# Patient Record
Sex: MALE | Race: BLACK OR AFRICAN AMERICAN | NOT HISPANIC OR LATINO | URBAN - METROPOLITAN AREA
[De-identification: names, ages, dates, MRNs, and addresses within clinical notes are randomized per-mention and may not be internally consistent; named-entity substitution may affect disease eponyms.]

---

## 2018-06-05 ENCOUNTER — INPATIENT (INPATIENT)
Facility: HOSPITAL | Age: 83
LOS: 6 days | Discharge: NURSING, REHAB OR LONG-TERM CARE | DRG: 481 | End: 2018-06-12
Attending: SPECIALIST | Admitting: INTERNAL MEDICINE
Payer: COMMERCIAL

## 2018-06-05 ENCOUNTER — TRANSCRIPTION ENCOUNTER (OUTPATIENT)
Age: 83
End: 2018-06-05

## 2018-06-05 VITALS
HEART RATE: 83 BPM | SYSTOLIC BLOOD PRESSURE: 175 MMHG | OXYGEN SATURATION: 94 % | RESPIRATION RATE: 18 BRPM | DIASTOLIC BLOOD PRESSURE: 82 MMHG

## 2018-06-05 DIAGNOSIS — S72.001A FRACTURE OF UNSPECIFIED PART OF NECK OF RIGHT FEMUR, INITIAL ENCOUNTER FOR CLOSED FRACTURE: ICD-10-CM

## 2018-06-05 LAB
ALBUMIN SERPL ELPH-MCNC: 3.9 G/DL — SIGNIFICANT CHANGE UP (ref 3.3–5)
ALP SERPL-CCNC: 67 U/L — SIGNIFICANT CHANGE UP (ref 40–120)
ALT FLD-CCNC: 17 U/L — SIGNIFICANT CHANGE UP (ref 10–45)
ANION GAP SERPL CALC-SCNC: 13 MMOL/L — SIGNIFICANT CHANGE UP (ref 5–17)
APTT BLD: 35.7 SEC — SIGNIFICANT CHANGE UP (ref 27.5–37.4)
AST SERPL-CCNC: 20 U/L — SIGNIFICANT CHANGE UP (ref 10–40)
BASOPHILS # BLD AUTO: 0 K/UL — SIGNIFICANT CHANGE UP (ref 0–0.2)
BASOPHILS NFR BLD AUTO: 0.6 % — SIGNIFICANT CHANGE UP (ref 0–2)
BILIRUB SERPL-MCNC: 0.2 MG/DL — SIGNIFICANT CHANGE UP (ref 0.2–1.2)
BLD GP AB SCN SERPL QL: NEGATIVE — SIGNIFICANT CHANGE UP
BUN SERPL-MCNC: 21 MG/DL — SIGNIFICANT CHANGE UP (ref 7–23)
CALCIUM SERPL-MCNC: 9.3 MG/DL — SIGNIFICANT CHANGE UP (ref 8.4–10.5)
CHLORIDE SERPL-SCNC: 102 MMOL/L — SIGNIFICANT CHANGE UP (ref 96–108)
CO2 SERPL-SCNC: 28 MMOL/L — SIGNIFICANT CHANGE UP (ref 22–31)
CREAT SERPL-MCNC: 1.39 MG/DL — HIGH (ref 0.5–1.3)
EOSINOPHIL # BLD AUTO: 0 K/UL — SIGNIFICANT CHANGE UP (ref 0–0.5)
EOSINOPHIL NFR BLD AUTO: 0.6 % — SIGNIFICANT CHANGE UP (ref 0–6)
GLUCOSE SERPL-MCNC: 154 MG/DL — HIGH (ref 70–99)
HCT VFR BLD CALC: 37.8 % — LOW (ref 39–50)
HGB BLD-MCNC: 12.7 G/DL — LOW (ref 13–17)
INR BLD: 1.05 RATIO — SIGNIFICANT CHANGE UP (ref 0.88–1.16)
LYMPHOCYTES # BLD AUTO: 2.2 K/UL — SIGNIFICANT CHANGE UP (ref 1–3.3)
LYMPHOCYTES # BLD AUTO: 40 % — SIGNIFICANT CHANGE UP (ref 13–44)
MCHC RBC-ENTMCNC: 30.3 PG — SIGNIFICANT CHANGE UP (ref 27–34)
MCHC RBC-ENTMCNC: 33.7 GM/DL — SIGNIFICANT CHANGE UP (ref 32–36)
MCV RBC AUTO: 89.8 FL — SIGNIFICANT CHANGE UP (ref 80–100)
MONOCYTES # BLD AUTO: 0.6 K/UL — SIGNIFICANT CHANGE UP (ref 0–0.9)
MONOCYTES NFR BLD AUTO: 11 % — SIGNIFICANT CHANGE UP (ref 2–14)
NEUTROPHILS # BLD AUTO: 2.7 K/UL — SIGNIFICANT CHANGE UP (ref 1.8–7.4)
NEUTROPHILS NFR BLD AUTO: 47.8 % — SIGNIFICANT CHANGE UP (ref 43–77)
PLATELET # BLD AUTO: 195 K/UL — SIGNIFICANT CHANGE UP (ref 150–400)
POTASSIUM SERPL-MCNC: 4 MMOL/L — SIGNIFICANT CHANGE UP (ref 3.5–5.3)
POTASSIUM SERPL-SCNC: 4 MMOL/L — SIGNIFICANT CHANGE UP (ref 3.5–5.3)
PROT SERPL-MCNC: 7.4 G/DL — SIGNIFICANT CHANGE UP (ref 6–8.3)
PROTHROM AB SERPL-ACNC: 11.4 SEC — SIGNIFICANT CHANGE UP (ref 9.8–12.7)
RBC # BLD: 4.2 M/UL — SIGNIFICANT CHANGE UP (ref 4.2–5.8)
RBC # FLD: 12.8 % — SIGNIFICANT CHANGE UP (ref 10.3–14.5)
RH IG SCN BLD-IMP: POSITIVE — SIGNIFICANT CHANGE UP
RH IG SCN BLD-IMP: POSITIVE — SIGNIFICANT CHANGE UP
SODIUM SERPL-SCNC: 143 MMOL/L — SIGNIFICANT CHANGE UP (ref 135–145)
WBC # BLD: 5.6 K/UL — SIGNIFICANT CHANGE UP (ref 3.8–10.5)
WBC # FLD AUTO: 5.6 K/UL — SIGNIFICANT CHANGE UP (ref 3.8–10.5)

## 2018-06-05 PROCEDURE — 73552 X-RAY EXAM OF FEMUR 2/>: CPT | Mod: 26,RT

## 2018-06-05 PROCEDURE — 73502 X-RAY EXAM HIP UNI 2-3 VIEWS: CPT | Mod: 26,RT

## 2018-06-05 PROCEDURE — 73560 X-RAY EXAM OF KNEE 1 OR 2: CPT | Mod: 26,RT

## 2018-06-05 PROCEDURE — 93010 ELECTROCARDIOGRAM REPORT: CPT

## 2018-06-05 PROCEDURE — 99284 EMERGENCY DEPT VISIT MOD MDM: CPT | Mod: 25

## 2018-06-05 PROCEDURE — 70450 CT HEAD/BRAIN W/O DYE: CPT | Mod: 26

## 2018-06-05 PROCEDURE — 71045 X-RAY EXAM CHEST 1 VIEW: CPT | Mod: 26

## 2018-06-05 PROCEDURE — 72125 CT NECK SPINE W/O DYE: CPT | Mod: 26

## 2018-06-05 RX ORDER — MORPHINE SULFATE 50 MG/1
4 CAPSULE, EXTENDED RELEASE ORAL ONCE
Qty: 0 | Refills: 0 | Status: DISCONTINUED | OUTPATIENT
Start: 2018-06-05 | End: 2018-06-05

## 2018-06-05 RX ORDER — ACETAMINOPHEN 500 MG
1000 TABLET ORAL ONCE
Qty: 0 | Refills: 0 | Status: COMPLETED | OUTPATIENT
Start: 2018-06-05 | End: 2018-06-05

## 2018-06-05 RX ADMIN — Medication 400 MILLIGRAM(S): at 21:06

## 2018-06-05 RX ADMIN — MORPHINE SULFATE 4 MILLIGRAM(S): 50 CAPSULE, EXTENDED RELEASE ORAL at 21:06

## 2018-06-05 NOTE — CONSULT NOTE ADULT - SUBJECTIVE AND OBJECTIVE BOX
The patient was seen and examined tonight after an accidental fall with a right femoral neck fracture that requires orthopedic ORIF. 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 The patient is medically stable, medically optimized and has no medical contraindication to surgery tomorrow as required. Exam time 70 minutes including > than 50 % for bedside discussion and counseling, with his childern. Comprehensive consultation dictated #[phone]

## 2018-06-05 NOTE — ED PROVIDER NOTE - OBJECTIVE STATEMENT
84 yo male with PMHx of DM, dementia p/w right hip pain s/p fall. The patient reports that he was walking the recycling out of the kitchen when he tripped and landed on his right hip/buttock.  Fall was unwitnessed, but patient denies head trauma or LOC.  Patient only c/o right hip pain.  Unable to ambulate after fall.  Denies headache, weakness, numbness, fevers/chills, CP, SOB, abd pain, NVDC.

## 2018-06-05 NOTE — ED PROVIDER NOTE - ATTENDING CONTRIBUTION TO CARE
Private Physician James Gaines PCP, [facility]  83y male DM, Dementia, Pt comes to ed complains of pain rt hip sp trip an fall in kitchen at home, Not able to weight bear, No other injury or complaint . No loc,cp,palps,sob,cough,fc,abd pain. PE WDWN male awake alert normocephalic atraumatic neck supple neg by nexus chest clear anterior & posterior abd soft neuro gcs 15 speech fluent oriented x2 pain light touch intact  Anup Hernandez MD, Facep

## 2018-06-05 NOTE — CONSULT NOTE ADULT - ATTENDING COMMENTS
The patient is medically stable, medically optimized and has no medical contraindication to surgery tomorrow as required. Exam time 70 minutes including > than 50 % for bedside discussion and counseling. He is to continue Risperadol as needed TID frlkkkkkkkkkkkkkkkkkkkkkkkkkkkkkkkkkkkkkkkkkkkkkkkkkkkkkkkkkkkkkkkkkkkkkkkkkkkkkkkkkkkkkkkkkkkkkkkkkkkkkkkkkkkkkkkkkkkkkkkkkkkkkkkkkkkkkkkkkkkkkkkkkkkkkkkkkkkkkkkkkkkkkkkkkkkkkkkkkkkkkkkkkkkkkkkkkkkkkkkkkkkkkkkkkkkkkkkkkkkkkkkkkkkkkkkkkkkkkkkkkkkkkkkkkkkkkkkkkkkkkkkkkkkkkkkkkkkkkkkkkkkkkkkkkkkkkkkkkkkkkkkkkkkkkkkkkkkkkkkkkkkkkkkkkkkkkkkkkkkkkkkkkkkkkkkkkkkkkkkkkkkkkkkkkkkkkkkkkkkkkkkkkkkkkkkkkkkkkkkkkkkkkkkkkkkkkkkkkkkkkkkkkkkkkkkkkkkkkkkkkkkkkkkkkkkkkkkkkkkkkkkkkkkkkkkkkkkkkkkkkkkkkkkkkkkkkkkkkkkkkkkkkkkkkkkkkkkkkkkkkkkkkkkkkkkkkkkkkkkkkkkkkkkkkkkkkkkkkkkkkkkkkkkkkkkkkkkkkkkkkkkkkkkkkkkkkkkkkkkkkkkkkkkkkkkkkkkkkkkkkkkkkkkkkkkkkkkkkkkkkkkkkkkkkkkkkkkkkkkkkkkkkkkkkkkkkkkkkkkkkkkkkkkkkkkkkkkkkkkkkkkkkkkkkkkkkkkkkkkkkkkkkkkkkkkkkkkkkkkkkkkkkkkkkkkkkkkkkkkkkkkkkkkkkkkkkkkkkkkkkkkkkkkkkkkkkkkkkkkkkkkkkkkkkkkkkkkkkkkkkkkkkkkkkkkkkkkkkkkkkkkkkkkkkkkkkkkkkkkkkkkkkkkkkkkkkkkkkkkkkkkkkkkkkkkkkkkkkkkkkkkkkkkkkkkkkkkkkkkkkkkkkkkkkkkkkkkkkkkkkkkkkkkkkkkkkkkkkkkkkkkkkkkkkkkkkkkkkkkkkkkkkkkkkkkkkkkkkkkkkkkkkkkkkkkkkkkkkkkkkkkkkkkkkkkkkkkkkkkkkkkkkkkkkkkkkkkkkkkkkkkkkkkkkkkkkkkkkkkkkkkkkkkkkkkkkkkkkkkkkkkkkkkkkkkkkkkkkkkkkkkkkkkkkkkkkkkkkkkkkkkkkkkkkkkkkkkkkkkkkkkkkkkkkkkkkkkkkkkkkkkkkkkkkkkkkkkkkkkkkkkkkkkkkkkkkkkkkkkkkkkkkkkkkkkkkkkkkkkkkkkkkkkkkkkkkkkkkkkkkkkkkkkkkkkkkkkkkkkkkkkkkkkkkkkkkkkkkkkkkkkkkkkkkkkkkkkkkkkkkkkkkkkkkkkkkkkkkkkkkkkkkkkkkkkkkkkkkkkkkkkkkkkkkkkkkkkkkkkkkkkkkkkkkkkkkkkkkkkkkkkkkkkkkkkkkkkkkkkkkkkkkkkkkkkkkkkkkkkkkkkkkkkkkkkkkkkkkkkkkkkkkkkkkkkkkkkkkkkkkkkkkkkkkkkkkkkkkkkkkkkkkkkkkkkkkkkkkkkkkkkkkkkkkkkkkkkkkkkkkkkkkkkkkkkkkkkkkkkkkkkkkkkkkkkkkkkkkkkkkkkkkkkkkkkkkkkkkkkkkkkkkkkkkkkkkkkkkkkkkkkkkkkkkkkkkkkkkkkkkkkkkkkkkkkkkkkkkkkkkkkkkkkkkkkkkkkkkkkkkkkkkkkkkkkkkkkkkkkkkkkkkkkkkkkkkkkkkkkkkkkkkkkkkkkkkkkkkkkkkkkkkkkkkkkkkkkkkkkkkkkkkkkkkkkkkkkkkkkkkkkkkkkkkkkkkkkkkkkkkkkkkkkkkkkkkkkkkkkkkkkkkkkkkkkkkkkkkkkkkkkkkkkkkkkkkkkkkkkkkkkkkkkkkkkkkkkkkkkkkkkkkkkkkkkkkkkkkkkkkkkkkkkkkkkkkkkkkkkkkkkkkkkkkkkkkkkkkkkkkkkkkkkkkkkkkkkkkkkkkkkkkkkkkkkkkkkkkkkkkkkkkkkkkkkkkkkkkkkkkkkkkkkkkkkkkkkkkkkkkkkkkkkkkkkkkkkkkkkkkkkkkkkkkkkkkkkkkkkkkkkkkkkkkkkkkkkkkkkkkkkkkkkkkkkkkkkkkkkkkkkkkkkkkkkkkkkkkkkkkkkkkkkkkkkkkkkkkkkkkkkkkkkkkkkkkkkkkkkkkkkkkkkkkkkkkkkkkkkkkkkkkkkkkkkkkkkkkkkkkkkkkkkkkkkkkkkkkkkkkkkkkkkkkkkkkkkkkkkkkkkkkkkkkkkkkkkkkkkkkkkkkkkkkkkkkkkkkkkkkkkkkkkkkkkkkkkkkkkkkkkkkkkkkkkkkkkkkkkkkkkkkkkkkkkkkkkkkkkkkkkkkkkkkkkkkkkkkkkkkkkkkkkkkkkkkkkkkkkkkkkkkkkkkkkkkkkkkkkkkkkkkkkkkkkkkkkkkkkkkkkkkkkkkkkkkkkkkkkkkkkkkkkkkkkkkkkkkkkkkkkkkkkkkkkkkkkkkkkkkkkkkkkkkkkkkkkkkkkkkkkkkkkkkkkkkkkkkkkkkkkkkkkkkkkkkkkkkkkkkkkkkkkkkkkkkkkkkkkkkkkkkkkkkkkkkkkkkkkkkkkkkkkkkkkkkkkkkkkkkkkkkkkkkkkkkkkkkkkkkkkkkkkkkkkkkkkkkkkkkkkkkkkkkkkkkk                    The patient is medically stable, medically optimized and has no medical contraindication to surgery tomorrow as required. Exam time 70 minutes including > than 50 % for bedside discussion and counseling, with thepatient and fakpks                                                          The patient is medically stable, medically optimized and has no medical contraindication to surgery tomorrow as required. Exam time 70 minutes including > than 50 % for bedside discussion and counseling.

## 2018-06-05 NOTE — ED PROVIDER NOTE - PHYSICAL EXAMINATION
Gen: AAO x 2, NAD  Skin: No rashes or lesions  HEENT: NC/AT, PERRLA, EOMI, MMM  Resp: unlabored CTAB  Cardiac: rrr s1s2, no murmurs, rubs or gallops  GI: ND, +BS, Soft, NT  MSK: No midline cervical/thoracic/lumbar TTP, clavicles intact.  No snuff box TTP, compartments soft, +right hip pain.  Decreased ROM of right hip 2/2 pain.  2+ DP and PT BL  Neuro: no focal deficits

## 2018-06-05 NOTE — ED ADULT NURSE NOTE - OBJECTIVE STATEMENT
82 y/o male, a&o x3, brought in by EMS s/p mech trip and fall in kitchen while moving recyclables. Pt denies LOC, head/neck/back pain, weakness, dizziness, nausea, vomiting, fevers, chills, SOB, or chest pain. Breathing even, full, unlabored. Abdomen soft, nontender, nondistended. Tenderness and pain of right hip. No ecchymosis, no edema, no rotation/shortening noted due to pt position of comfort with leg shirlene while lying on right side. Safety maintained, stretcher locked in low position. Advised of plan of care. Family at bedside.

## 2018-06-05 NOTE — CONSULT NOTE ADULT - NSHPATTENDINGPLANDISCUSS_GEN_ALL_CORE
the patient, his son and daughter Merary4#fffffffffffffffffffffffffffffffffffffffffffffffffffffffffffffffffffffffffffffffffffffffffffffffffffffffffffffffffffffffffffffffffffffffffffffffffffffffffffffffffffffffffffffffffffffffffffffffffffffffffffffffffffffffffffffffffffffffffffffffffffffffffffffffffffffffffffffffffffffffffffffffffffffffffffffffffffffffffffffffffffffffffffffffffffffffffffffffffffffffffffffffffffffffffffffffffffffffffffffffffffffffffffffffffffffffffffffffffffffffffffffffffffffffffffffffffffffffffffffffffffffffffffffffffffffffffffffffffffffffffffffffffffffffffffffffffffffffffffffffffffffffffffffffffffffffffffffffffffffffffffffffffffffffffffffffffffffffffffffffffffffffffffffffffffffffffffffffffffffffffffffffffffffffffffffffffffffffffffffffffffffffffffffffffffffffffffffffffffffffffffffffffffffffffffffffffffffffffffffffffffffffffffffffffffffffffffffffffffffffffffffffffffffffffffffffffffffffffffffffffffffffffffffffffffffffffffffffffffffffffffffffffffffffffffffffffffffffffffffffffffffffffffffffffffffffffffffffffffffffffffffffffffffffffffffffffffffffffffffffffffffffffffffffffffffffffffffffffffffffffffffffffffffffffffffffffffffffffffffffffffffffffffffffffffffffffffffffffffffffffffffffffffffffffffffffffffffffffffffffffffffffffffffffffffffffffffffffffffffffffffffffffffffffffffffffffffffffffffffffffffffffffffffffffffffffffffffffffffffffffffffffffffffffffffffffffffffffffffffffffffffffffffffffffffffffffffffffffffffffffffffffffffffffffffffffffffffffffffffffffffffffffffffffffffffff

## 2018-06-05 NOTE — ED PROVIDER NOTE - PROGRESS NOTE DETAILS
James Gaines paged  Anup Hernandez MD, Facep Discussed with Dr Eder miller ortho and [facility]  [facility]  Anup Hernandez MD, Facep

## 2018-06-05 NOTE — CONSULT NOTE ADULT - SUBJECTIVE AND OBJECTIVE BOX
CONSULTING SERVICE:  Internal Medicine.    HISTORY OF PRESENT ILLNESS:  This is the first recent [facility] admission for this 83-year-old male who was at home in his kitchen when he accidentally tripped when turning and fell to the ground landing on his right hip.  He was unable to rise because of pain and he was brought by ambulance to the emergency room for evaluation.  X-rays confirmed an acute right femoral neck fracture, which requires ORIF as scheduled for tomorrow morning.  The patient has a history of insulin-dependent diabetes and has diagnosis of senile dementia which has been slowly progressive.  He is cared for by his wife and son and has direct observation at all times because of his forgetfulness and confusion    MEDICATIONS:  The patient's medications at present time include metformin 500 mg twice a day, risperidone 0.25 mg three times a day, mirtazapine 30 mg at bedtime, Levemir insulin 34 units in the morning, NovoLog insulin 6 units also in the morning.    ALLERGIES:  HE HAS NO KNOWN ALLERGIES.    PAST SURGICAL HISTORY:  Includes hernia repair in 2011.    PAST MEDICAL HISTORY:  Normal, except for dementia and insulin-dependent diabetes.    FAMILY HISTORY:  Significant for multiple members of family have dementia.  His grandmother had diabetes.    DIET:  The patient's diet is low sugar.  His weight is 167 pounds.          SOCIAL HISTORY:  He is a nonsmoker, nondrinker with a distant history of smoking over 30 years ago.  He has occasional social alcohol.  He has no drug history.  Socially, he is retired [employer].  He lives with his wife and son.    REVIEW OF SYSTEMS:  Essentially normal.  He has no headaches or dizziness.  Decreased hearing, decreased vision associated with his age.  No sinusitis.  Poor dentition with dentures bilaterally.  He has no difficulty swallowing.  He has no shortness of breath, cough, congestion or wheezing at rest or with exertion.  He has no chest pain, angina, palpitations or blackouts.  He has no syncopal events.  He has no abdominal pain, change in bowel habits or GI bleeding.  He has no reflux esophagitis.  He has no dysuria, frequency, hesitancy or incontinence.  He has no nocturia.  He has occasional rashes on his abdomen at the sites where he injects Levemir insulin.  His neurological examination shows no focal deficits.  He does have decreased recent memory.  He has no joint pains or limitations in function.  He has no arthritis of the knees, hips or hands.  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    PHYSICAL EXAMINATION:  At this time shows, VITAL SIGNS:  Blood pressure of 120/70, pulse of 68, respirations 16.  He is afebrile.  HEENT:  Head and neck examination is normal.  He has no oral masses.  CHEST:  Clear with good breath sounds bilaterally.  CARDIAC:  Examination without gallops or murmurs.  ABDOMEN:  Soft.  No masses, tenderness, guarding or rebound.  EXTREMITIES:  Without clubbing, cyanosis or edema.  NEUROLOGIC:  He has no focal neurological deficits.    LABORATORY DATA:  Laboratories that were obtained shows a hemoglobin of 12.7, hematocrit 37.8, white count 5.6, platelet count is 195,000.  INR is 1.05.  PTT is 35.7, sodium 143, potassium 4.0, chloride 102, CO2 is 28, BUN is 21, creatinine is 1.39, blood sugars 154.  Liver function tests are within normal limits.    RADIOLOGY DATA:  CT of the head was performed which showed calcification and bilateral basal ganglia and mild to moderate chronic microvascular changes with some dilatation of the sternum.  CT of the neck was performed showing DJD.  His chest x-ray was performed that shows patchy infiltrates and opacities in the upper lobe greater the left than right.  EKG is normal with normal sinus rhythm, heart rate of 67, normal ST-T waves.    IMPRESSION AND PLAN:  The impression at this time is the patient had an accidental fall with right femoral neck impacted fracture, which requires open reduction and internal fixation in the morning.  He has advanced senile dementia with agitation, requiring Risperdal for control.  Blood sugar monitoring will take place and with fingersticks when NPO.  EKG was obtained, normal sinus rhythm with normal ST-T waves with normal EKG.  The impression is the patient had an accidental fall with an acute right femoral neck fracture which requires surgical stabilization in the morning.  He has senile dementia and insulin-dependent diabetes which are chronic conditions and there are no acute changes as a result.  The patient is medically stable and has no medical contraindications to surgery tomorrow as is required.  Examination time was 70 minutes of which greater than 50% was necessary for bedside discussion and counseling.        _______________________    DICT:	GUILLERMO GEORGE MD  ([id number]) 06/06/2018 02:29 AM  TRANS:	[id number] 06/06/2018 02:33 AM  JOB:	[id number]    Electronically Signed by:  GUILLERMO GEORGE 06/06/2018 08:45:50 PM

## 2018-06-06 ENCOUNTER — TRANSCRIPTION ENCOUNTER (OUTPATIENT)
Age: 83
End: 2018-06-06

## 2018-06-06 DIAGNOSIS — F03.90 UNSPECIFIED DEMENTIA WITHOUT BEHAVIORAL DISTURBANCE: ICD-10-CM

## 2018-06-06 DIAGNOSIS — E11.9 TYPE 2 DIABETES MELLITUS WITHOUT COMPLICATIONS: ICD-10-CM

## 2018-06-06 DIAGNOSIS — S72.001A FRACTURE OF UNSPECIFIED PART OF NECK OF RIGHT FEMUR, INITIAL ENCOUNTER FOR CLOSED FRACTURE: ICD-10-CM

## 2018-06-06 LAB
ANION GAP SERPL CALC-SCNC: 12 MMOL/L — SIGNIFICANT CHANGE UP (ref 5–17)
APTT BLD: 34.9 SEC — SIGNIFICANT CHANGE UP (ref 27.5–37.4)
BLD GP AB SCN SERPL QL: NEGATIVE — SIGNIFICANT CHANGE UP
BUN SERPL-MCNC: 19 MG/DL — SIGNIFICANT CHANGE UP (ref 7–23)
CALCIUM SERPL-MCNC: 9.1 MG/DL — SIGNIFICANT CHANGE UP (ref 8.4–10.5)
CHLORIDE SERPL-SCNC: 101 MMOL/L — SIGNIFICANT CHANGE UP (ref 96–108)
CO2 SERPL-SCNC: 25 MMOL/L — SIGNIFICANT CHANGE UP (ref 22–31)
CREAT SERPL-MCNC: 1.17 MG/DL — SIGNIFICANT CHANGE UP (ref 0.5–1.3)
GLUCOSE BLDC GLUCOMTR-MCNC: 177 MG/DL — HIGH (ref 70–99)
GLUCOSE BLDC GLUCOMTR-MCNC: 190 MG/DL — HIGH (ref 70–99)
GLUCOSE BLDC GLUCOMTR-MCNC: 200 MG/DL — HIGH (ref 70–99)
GLUCOSE BLDC GLUCOMTR-MCNC: 216 MG/DL — HIGH (ref 70–99)
GLUCOSE BLDC GLUCOMTR-MCNC: 231 MG/DL — HIGH (ref 70–99)
GLUCOSE BLDC GLUCOMTR-MCNC: 239 MG/DL — HIGH (ref 70–99)
GLUCOSE SERPL-MCNC: 255 MG/DL — HIGH (ref 70–99)
HBA1C BLD-MCNC: 7.4 % — HIGH (ref 4–5.6)
HCT VFR BLD CALC: 37.4 % — LOW (ref 39–50)
HCT VFR BLD CALC: 39.4 % — SIGNIFICANT CHANGE UP (ref 39–50)
HGB BLD-MCNC: 12.8 G/DL — LOW (ref 13–17)
HGB BLD-MCNC: 13 G/DL — SIGNIFICANT CHANGE UP (ref 13–17)
INR BLD: 1.2 RATIO — HIGH (ref 0.88–1.16)
MCHC RBC-ENTMCNC: 29.5 PG — SIGNIFICANT CHANGE UP (ref 27–34)
MCHC RBC-ENTMCNC: 30.6 PG — SIGNIFICANT CHANGE UP (ref 27–34)
MCHC RBC-ENTMCNC: 33.1 GM/DL — SIGNIFICANT CHANGE UP (ref 32–36)
MCHC RBC-ENTMCNC: 34.2 GM/DL — SIGNIFICANT CHANGE UP (ref 32–36)
MCV RBC AUTO: 89.2 FL — SIGNIFICANT CHANGE UP (ref 80–100)
MCV RBC AUTO: 89.6 FL — SIGNIFICANT CHANGE UP (ref 80–100)
PLATELET # BLD AUTO: 165 K/UL — SIGNIFICANT CHANGE UP (ref 150–400)
PLATELET # BLD AUTO: 186 K/UL — SIGNIFICANT CHANGE UP (ref 150–400)
POTASSIUM SERPL-MCNC: 4.7 MMOL/L — SIGNIFICANT CHANGE UP (ref 3.5–5.3)
POTASSIUM SERPL-SCNC: 4.7 MMOL/L — SIGNIFICANT CHANGE UP (ref 3.5–5.3)
PROTHROM AB SERPL-ACNC: 13.1 SEC — HIGH (ref 9.8–12.7)
RBC # BLD: 4.18 M/UL — LOW (ref 4.2–5.8)
RBC # BLD: 4.42 M/UL — SIGNIFICANT CHANGE UP (ref 4.2–5.8)
RBC # FLD: 12.8 % — SIGNIFICANT CHANGE UP (ref 10.3–14.5)
RBC # FLD: 12.8 % — SIGNIFICANT CHANGE UP (ref 10.3–14.5)
RH IG SCN BLD-IMP: POSITIVE — SIGNIFICANT CHANGE UP
SODIUM SERPL-SCNC: 138 MMOL/L — SIGNIFICANT CHANGE UP (ref 135–145)
WBC # BLD: 8.2 K/UL — SIGNIFICANT CHANGE UP (ref 3.8–10.5)
WBC # BLD: 8.7 K/UL — SIGNIFICANT CHANGE UP (ref 3.8–10.5)
WBC # FLD AUTO: 8.2 K/UL — SIGNIFICANT CHANGE UP (ref 3.8–10.5)
WBC # FLD AUTO: 8.7 K/UL — SIGNIFICANT CHANGE UP (ref 3.8–10.5)

## 2018-06-06 PROCEDURE — 72170 X-RAY EXAM OF PELVIS: CPT | Mod: 26

## 2018-06-06 PROCEDURE — 71045 X-RAY EXAM CHEST 1 VIEW: CPT | Mod: 26

## 2018-06-06 RX ORDER — DEXTROSE 50 % IN WATER 50 %
15 SYRINGE (ML) INTRAVENOUS ONCE
Qty: 0 | Refills: 0 | Status: DISCONTINUED | OUTPATIENT
Start: 2018-06-06 | End: 2018-06-06

## 2018-06-06 RX ORDER — OXYCODONE HYDROCHLORIDE 5 MG/1
5 TABLET ORAL EVERY 4 HOURS
Qty: 0 | Refills: 0 | Status: DISCONTINUED | OUTPATIENT
Start: 2018-06-06 | End: 2018-06-10

## 2018-06-06 RX ORDER — SODIUM CHLORIDE 9 MG/ML
1000 INJECTION INTRAMUSCULAR; INTRAVENOUS; SUBCUTANEOUS
Qty: 0 | Refills: 0 | Status: DISCONTINUED | OUTPATIENT
Start: 2018-06-06 | End: 2018-06-06

## 2018-06-06 RX ORDER — SODIUM CHLORIDE 9 MG/ML
1000 INJECTION, SOLUTION INTRAVENOUS
Qty: 0 | Refills: 0 | Status: DISCONTINUED | OUTPATIENT
Start: 2018-06-06 | End: 2018-06-08

## 2018-06-06 RX ORDER — ACETAMINOPHEN 500 MG
975 TABLET ORAL EVERY 8 HOURS
Qty: 0 | Refills: 0 | Status: DISCONTINUED | OUTPATIENT
Start: 2018-06-06 | End: 2018-06-06

## 2018-06-06 RX ORDER — ASPIRIN/CALCIUM CARB/MAGNESIUM 324 MG
81 TABLET ORAL DAILY
Qty: 0 | Refills: 0 | Status: DISCONTINUED | OUTPATIENT
Start: 2018-06-06 | End: 2018-06-12

## 2018-06-06 RX ORDER — OXYCODONE HYDROCHLORIDE 5 MG/1
5 TABLET ORAL EVERY 4 HOURS
Qty: 0 | Refills: 0 | Status: DISCONTINUED | OUTPATIENT
Start: 2018-06-06 | End: 2018-06-06

## 2018-06-06 RX ORDER — GLUCAGON INJECTION, SOLUTION 0.5 MG/.1ML
1 INJECTION, SOLUTION SUBCUTANEOUS ONCE
Qty: 0 | Refills: 0 | Status: DISCONTINUED | OUTPATIENT
Start: 2018-06-06 | End: 2018-06-06

## 2018-06-06 RX ORDER — INSULIN LISPRO 100/ML
VIAL (ML) SUBCUTANEOUS
Qty: 0 | Refills: 0 | Status: DISCONTINUED | OUTPATIENT
Start: 2018-06-06 | End: 2018-06-10

## 2018-06-06 RX ORDER — OXYCODONE HYDROCHLORIDE 5 MG/1
10 TABLET ORAL EVERY 4 HOURS
Qty: 0 | Refills: 0 | Status: DISCONTINUED | OUTPATIENT
Start: 2018-06-06 | End: 2018-06-10

## 2018-06-06 RX ORDER — DEXTROSE 50 % IN WATER 50 %
12.5 SYRINGE (ML) INTRAVENOUS ONCE
Qty: 0 | Refills: 0 | Status: DISCONTINUED | OUTPATIENT
Start: 2018-06-06 | End: 2018-06-06

## 2018-06-06 RX ORDER — INSULIN DETEMIR 100/ML (3)
0 INSULIN PEN (ML) SUBCUTANEOUS
Qty: 0 | Refills: 0 | COMMUNITY

## 2018-06-06 RX ORDER — HYDROMORPHONE HYDROCHLORIDE 2 MG/ML
0.25 INJECTION INTRAMUSCULAR; INTRAVENOUS; SUBCUTANEOUS
Qty: 0 | Refills: 0 | Status: DISCONTINUED | OUTPATIENT
Start: 2018-06-06 | End: 2018-06-06

## 2018-06-06 RX ORDER — RISPERIDONE 4 MG/1
1 TABLET ORAL
Qty: 0 | Refills: 0 | Status: DISCONTINUED | OUTPATIENT
Start: 2018-06-06 | End: 2018-06-08

## 2018-06-06 RX ORDER — INSULIN LISPRO 100/ML
VIAL (ML) SUBCUTANEOUS AT BEDTIME
Qty: 0 | Refills: 0 | Status: DISCONTINUED | OUTPATIENT
Start: 2018-06-06 | End: 2018-06-06

## 2018-06-06 RX ORDER — SODIUM CHLORIDE 9 MG/ML
1000 INJECTION, SOLUTION INTRAVENOUS
Qty: 0 | Refills: 0 | Status: DISCONTINUED | OUTPATIENT
Start: 2018-06-06 | End: 2018-06-06

## 2018-06-06 RX ORDER — DEXTROSE 50 % IN WATER 50 %
25 SYRINGE (ML) INTRAVENOUS ONCE
Qty: 0 | Refills: 0 | Status: DISCONTINUED | OUTPATIENT
Start: 2018-06-06 | End: 2018-06-06

## 2018-06-06 RX ORDER — DOCUSATE SODIUM 100 MG
100 CAPSULE ORAL THREE TIMES A DAY
Qty: 0 | Refills: 0 | Status: DISCONTINUED | OUTPATIENT
Start: 2018-06-06 | End: 2018-06-06

## 2018-06-06 RX ORDER — INSULIN LISPRO 100/ML
VIAL (ML) SUBCUTANEOUS AT BEDTIME
Qty: 0 | Refills: 0 | Status: DISCONTINUED | OUTPATIENT
Start: 2018-06-06 | End: 2018-06-10

## 2018-06-06 RX ORDER — OXYCODONE HYDROCHLORIDE 5 MG/1
2.5 TABLET ORAL EVERY 4 HOURS
Qty: 0 | Refills: 0 | Status: DISCONTINUED | OUTPATIENT
Start: 2018-06-06 | End: 2018-06-06

## 2018-06-06 RX ORDER — CEFAZOLIN SODIUM 1 G
2000 VIAL (EA) INJECTION EVERY 8 HOURS
Qty: 0 | Refills: 0 | Status: COMPLETED | OUTPATIENT
Start: 2018-06-06 | End: 2018-06-07

## 2018-06-06 RX ORDER — SENNA PLUS 8.6 MG/1
2 TABLET ORAL AT BEDTIME
Qty: 0 | Refills: 0 | Status: DISCONTINUED | OUTPATIENT
Start: 2018-06-06 | End: 2018-06-06

## 2018-06-06 RX ORDER — ONDANSETRON 8 MG/1
4 TABLET, FILM COATED ORAL EVERY 6 HOURS
Qty: 0 | Refills: 0 | Status: DISCONTINUED | OUTPATIENT
Start: 2018-06-06 | End: 2018-06-12

## 2018-06-06 RX ORDER — SENNA PLUS 8.6 MG/1
2 TABLET ORAL AT BEDTIME
Qty: 0 | Refills: 0 | Status: DISCONTINUED | OUTPATIENT
Start: 2018-06-06 | End: 2018-06-12

## 2018-06-06 RX ORDER — RISPERIDONE 4 MG/1
1 TABLET ORAL
Qty: 0 | Refills: 0 | Status: DISCONTINUED | OUTPATIENT
Start: 2018-06-06 | End: 2018-06-06

## 2018-06-06 RX ORDER — INSULIN LISPRO 100/ML
VIAL (ML) SUBCUTANEOUS
Qty: 0 | Refills: 0 | Status: DISCONTINUED | OUTPATIENT
Start: 2018-06-06 | End: 2018-06-06

## 2018-06-06 RX ORDER — ACETAMINOPHEN 500 MG
650 TABLET ORAL EVERY 6 HOURS
Qty: 0 | Refills: 0 | Status: DISCONTINUED | OUTPATIENT
Start: 2018-06-06 | End: 2018-06-12

## 2018-06-06 RX ORDER — ONDANSETRON 8 MG/1
4 TABLET, FILM COATED ORAL ONCE
Qty: 0 | Refills: 0 | Status: DISCONTINUED | OUTPATIENT
Start: 2018-06-06 | End: 2018-06-06

## 2018-06-06 RX ADMIN — OXYCODONE HYDROCHLORIDE 5 MILLIGRAM(S): 5 TABLET ORAL at 09:49

## 2018-06-06 RX ADMIN — Medication 2: at 08:37

## 2018-06-06 RX ADMIN — Medication 81 MILLIGRAM(S): at 23:15

## 2018-06-06 RX ADMIN — OXYCODONE HYDROCHLORIDE 10 MILLIGRAM(S): 5 TABLET ORAL at 23:16

## 2018-06-06 RX ADMIN — SODIUM CHLORIDE 100 MILLILITER(S): 9 INJECTION INTRAMUSCULAR; INTRAVENOUS; SUBCUTANEOUS at 00:46

## 2018-06-06 RX ADMIN — OXYCODONE HYDROCHLORIDE 10 MILLIGRAM(S): 5 TABLET ORAL at 23:45

## 2018-06-06 RX ADMIN — SENNA PLUS 2 TABLET(S): 8.6 TABLET ORAL at 23:16

## 2018-06-06 RX ADMIN — OXYCODONE HYDROCHLORIDE 5 MILLIGRAM(S): 5 TABLET ORAL at 09:19

## 2018-06-06 RX ADMIN — Medication 1000 MILLIGRAM(S): at 00:44

## 2018-06-06 RX ADMIN — SODIUM CHLORIDE 75 MILLILITER(S): 9 INJECTION, SOLUTION INTRAVENOUS at 19:16

## 2018-06-06 RX ADMIN — RISPERIDONE 1 MILLIGRAM(S): 4 TABLET ORAL at 23:16

## 2018-06-06 RX ADMIN — Medication 100 MILLIGRAM(S): at 23:15

## 2018-06-06 RX ADMIN — MORPHINE SULFATE 4 MILLIGRAM(S): 50 CAPSULE, EXTENDED RELEASE ORAL at 00:44

## 2018-06-06 RX ADMIN — Medication 1: at 19:14

## 2018-06-06 RX ADMIN — Medication 2: at 13:02

## 2018-06-06 NOTE — DISCHARGE NOTE ADULT - CARE PLAN
Principal Discharge DX:	Closed fracture of right hip, initial encounter  Goal:	return to baseline activities of daily living  Assessment and plan of treatment:	1.	Pain Control  2.	Walking with full weight bearing as tolerated, with assistive devices (walker/Cane as Needed)  3.	DVT Prophylaxis for 30 days w ASA 81mg daily  4.	PT as needed  5.	Follow up with Dr. Newman as Outpatient in 10-14 Days after Discharge from the Hospital or Rehab. Call Office For Appointment.  6.	Remove Staples Post-Op Day 14, and Remove Dressing Post-Op Day 10, with Daily Dressing Changes as Need.  7.	Ice affected area as Needed  8.	Keep Dressing  Clean and dry.

## 2018-06-06 NOTE — H&P ADULT - HISTORY OF PRESENT ILLNESS
83 year old male, PMH Dementia, presented to the [facility] ED following a fall at home. Patient was moving around the recycling, when he tripped and fell to the ground. His son found him on the ground and called EMS. No CP/SOB. No head strike or LOC. Patient is a community ambulator with no assistive devices. However, son and daughter report that he likely needs assistive devices.

## 2018-06-06 NOTE — H&P ADULT - NSHPPHYSICALEXAM_GEN_ALL_CORE
RLE Shortened/Externally rotated  Pain with heel strike/log roll  EHL/FHL/TA/GSC intact  SILT DP/SP/S/S/T  Skin intact

## 2018-06-06 NOTE — DISCHARGE NOTE ADULT - NS AS ACTIVITY OBS
Walking-Outdoors allowed/Showering allowed/Walking-Indoors allowed Walking-Outdoors allowed/Stairs allowed/Showering allowed/Walking-Indoors allowed/Do not make important decisions/Do not drive or operate machinery/No Heavy lifting/straining

## 2018-06-06 NOTE — DISCHARGE NOTE ADULT - HOSPITAL COURSE
The patient is a 83M year old male status Right hip CRPP for a right femoral neck fracture. Patient presented to [facility] and medically cleared for an elective surgical procedure. The patient was taken to the operating room on date mentioned above. Prophylactic antibiotics were started before the procedure and continued for 24 hours.  There were no complications during the procedure and patient tolerated the procedure well.  The patient was transferred to recovery room in stable condition and subsequently to surgical floor.  Patient was placed ASA for anticoagulation.  All home medications were continued.  The patient received physical therapy daily and daily labs were followed.  The dressing was kept clean, dry, intact.  The rest of the hospital stay was unremarkable. The patient is a 83M year old male status Right hip CRPP on 6/6.18 for a right femoral neck fracture. Patient presented to [facility] and medically cleared for an elective surgical procedure. The patient was taken to the operating room on date mentioned above. Prophylactic antibiotics were started before the procedure and continued for 24 hours.  There were no complications during the procedure and patient tolerated the procedure well.  The patient was transferred to recovery room in stable condition and subsequently to surgical floor.  Patient was placed ASA for anticoagulation.  All home medications were continued.  The patient received physical therapy daily and daily labs were followed.  The dressing was kept clean, dry, intact.  The rest of the hospital stay was unremarkable. Patient was evaluated by physical therapist fot weight bearing as tolerated ambulation and advised that patient would benefit from admission to rehab facility. Patient should follow up with Dr Newman 3-4 weeks post op. The patient is a 83M year old male status Right hip CRPP on 6/6.18 for a right femoral neck fracture. Patient presented to [facility] and medically cleared for an elective surgical procedure. The patient was taken to the operating room on date mentioned above. Prophylactic antibiotics were started before the procedure and continued for 24 hours.  There were no complications during the procedure and patient tolerated the procedure well.  The patient was transferred to recovery room in stable condition and subsequently to surgical floor.  Patient was placed ASA for anticoagulation.  All home medications were continued.  The patient received physical therapy daily and daily labs were followed.  The dressing was kept clean, dry, intact.  The rest of the hospital stay was unremarkable. Patient was seen by Endocrinologist in hospital and recommendations were made and implemented.  Patient was evaluated by physical therapist for weight bearing as tolerated ambulation and advised that patient would benefit from admission to rehab facility. On 6/12/18, fingersticks were reviewed with endocrinology team and advised pt be discharged to Rehab with Lantus 25 units at bedtime and Humalog 10 units TID AC and no Metformin. Patient should follow up with Dr Newman 3-4 weeks post op and endocrinologist -Dr. Molina- upon discharge from Rehab for diabetes checkup.

## 2018-06-06 NOTE — DISCHARGE NOTE ADULT - PATIENT PORTAL LINK FT
You can access the [email or web address] Patient Portal, offered by [facility], by registering with the following website: [email or web address]

## 2018-06-06 NOTE — DISCHARGE NOTE ADULT - MEDICATION SUMMARY - MEDICATIONS TO STOP TAKING
I will STOP taking the medications listed below when I get home from the hospital:  None I will STOP taking the medications listed below when I get home from the hospital:    metFORMIN    mirtazapine    Levemir  -- 34 unit(s) subcutaneous once a day    NovoLOG 100 units/mL subcutaneous solution  -- 6 unit(s) subcutaneous 3 times a day (before meals)

## 2018-06-06 NOTE — CHART NOTE - NSCHARTNOTEFT_GEN_A_CORE
Patient resting without complaints.  Denies chest pain, SOB, N/V.    T(C): 37.1 (06-06-18 @ 19:15)  T(F): 98.8 (06-06-18 @ 19:15)  HR: 81 (06-06-18 @ 20:00)  BP: 135/66 (06-06-18 @ 20:00)  RR: 16 (06-06-18 @ 20:00)  SpO2: 99% (06-06-18 @ 20:00)      Exam:  Alert and Oriented, No Acute Distress    Cardio: RRR S1,S2    Pulm: CTA B/L    R Lower Extremity:  Hip Dsg CDI  Calf Soft, Non-tender  +PF/DF  Sensation grossly intact  +DP Pulse                          12.8   8.2   )-----------( 186      ( 06 Jun 2018 09:50 )             37.4        138  |  101  |  19  ----------------------------<  255<H>  4.7   |  25  |  1.17      A/P: 83y Male s/p R hip CRPP; Stable    -Pain Control  -DVT PPx; IS  -FU post-op CBC  -Am labs  -PT Eval-WBAT RLE  -Spoke with Medicine Dr Sims regarding pre-op CXR, likely fibrotic changes from h/o smoking given pt is asymptomatic including afebrile & no leukocytosis;   FU PCXR for comparison  -Continue Current Tx.    Brittani Rodriguez PA-C  Orthopedic Surgery  Pagers [phone]

## 2018-06-06 NOTE — H&P ADULT - ASSESSMENT
A/P: 83 year old male with R femoral neck fracture  - Admit to Ortho  - OR Planning  - Preoperative Labs: CBC, BMP, PT/INR, Type and Screen, UA, CXR, EKG  - Consent in chart  - Medicine Clearance  - NPO  - Hold anticoagulation for OR

## 2018-06-06 NOTE — BRIEF OPERATIVE NOTE - PROCEDURE
<<-----Click on this checkbox to enter Procedure Closed reduction and percutaneous pinning (CRPP)  06/06/2018    Active  SWEINER

## 2018-06-06 NOTE — DISCHARGE NOTE ADULT - ADDITIONAL INSTRUCTIONS
Follow up with your endocrinologist- . call Dr. Molina's office to schedule follow up appointment to discuss recent changes to your diabetes medications/general diabetes checkup.

## 2018-06-06 NOTE — DISCHARGE NOTE ADULT - MEDICATION SUMMARY - MEDICATIONS TO TAKE
I will START or STAY ON the medications listed below when I get home from the hospital:    oxyCODONE 10 mg oral tablet  -- 1 tab(s) by mouth every 4 hours, As needed, Moderate Pain  -- Indication: For prn pain    oxyCODONE 5 mg oral tablet  -- 1 tab(s) by mouth every 4 hours, As needed, Mild Pain  -- Indication: For prn pain    aspirin 81 mg oral delayed release tablet  -- 1 tab(s) by mouth once a day  -- Indication: For Dvt ppx    mirtazapine  -- Indication: For per pmd    insulin  -- Indication: For per pmd    Levemir  -- 34 unit(s) subcutaneous once a day  -- Indication: For per pmd    NovoLOG 100 units/mL subcutaneous solution  -- 6 unit(s) subcutaneous 3 times a day (before meals)  -- Indication: For per pmd    metFORMIN  -- Indication: For per pmd    risperiDONE  -- Indication: For per pmd I will START or STAY ON the medications listed below when I get home from the hospital:    aspirin 81 mg oral delayed release tablet  -- 1 tab(s) by mouth once a day  -- Indication: For Dvt ppx    oxyCODONE 10 mg oral tablet  -- 1 tab(s) by mouth every 6 hours, As Needed for Severe Pain  -- Indication: For Pain    oxyCODONE 5 mg oral tablet  -- 1 tab(s) by mouth every 6 hours, As Needed for Moderate Pain  -- Indication: For pain    acetaminophen 325 mg oral tablet  -- 3 tab(s) by mouth every 8 hours, As Needed - 3)  -- Indication: For Pain     Lantus 100 units/mL subcutaneous solution  -- 25 unit(s) subcutaneous once a day (at bedtime)  -- Indication: For Diabetes    HumaLOG 100 units/mL injectable solution  -- 10 unit(s) subcutaneously 3 times a day (before meals)  -- Indication: For Diabetes    atorvastatin 10 mg oral tablet  -- 1 tab(s) by mouth once a day (at bedtime)  -- Indication: For Hyperlipidemia    risperiDONE 0.25 mg oral tablet  -- 1 tab(s) by mouth 3 times a day  -- Indication: For Antipsychotic    senna oral tablet  -- 2 tab(s) by mouth once a day (at bedtime)  -- Indication: For laxative    Colace 100 mg oral capsule  -- 1 cap(s) by mouth 3 times a day  -- Indication: For laxative    Protonix 40 mg oral delayed release tablet  -- 1 tab(s) by mouth once a day before breakfast  -- Indication: For ulcer prophylaxis

## 2018-06-06 NOTE — DISCHARGE NOTE ADULT - PLAN OF CARE
return to baseline activities of daily living 1.	Pain Control  2.	Walking with full weight bearing as tolerated, with assistive devices (walker/Cane as Needed)  3.	DVT Prophylaxis for 30 days w ASA 81mg daily  4.	PT as needed  5.	Follow up with Dr. Newman as Outpatient in 10-14 Days after Discharge from the Hospital or Rehab. Call Office For Appointment.  6.	Remove Staples Post-Op Day 14, and Remove Dressing Post-Op Day 10, with Daily Dressing Changes as Need.  7.	Ice affected area as Needed  8.	Keep Dressing  Clean and dry.

## 2018-06-06 NOTE — DISCHARGE NOTE ADULT - CARE PROVIDER_API CALL
Gerald Newman), Orthopaedic Surgery  [address] [place] [address]  Phone: [phone]  Fax: [phone]

## 2018-06-07 LAB
ANION GAP SERPL CALC-SCNC: 17 MMOL/L — SIGNIFICANT CHANGE UP (ref 5–17)
BASOPHILS # BLD AUTO: 0 K/UL — SIGNIFICANT CHANGE UP (ref 0–0.2)
BASOPHILS NFR BLD AUTO: 0.4 % — SIGNIFICANT CHANGE UP (ref 0–2)
BUN SERPL-MCNC: 16 MG/DL — SIGNIFICANT CHANGE UP (ref 7–23)
CALCIUM SERPL-MCNC: 8.5 MG/DL — SIGNIFICANT CHANGE UP (ref 8.4–10.5)
CHLORIDE SERPL-SCNC: 98 MMOL/L — SIGNIFICANT CHANGE UP (ref 96–108)
CO2 SERPL-SCNC: 23 MMOL/L — SIGNIFICANT CHANGE UP (ref 22–31)
CREAT SERPL-MCNC: 1.06 MG/DL — SIGNIFICANT CHANGE UP (ref 0.5–1.3)
EOSINOPHIL # BLD AUTO: 0 K/UL — SIGNIFICANT CHANGE UP (ref 0–0.5)
EOSINOPHIL NFR BLD AUTO: 0.2 % — SIGNIFICANT CHANGE UP (ref 0–6)
GLUCOSE BLDC GLUCOMTR-MCNC: 218 MG/DL — HIGH (ref 70–99)
GLUCOSE BLDC GLUCOMTR-MCNC: 233 MG/DL — HIGH (ref 70–99)
GLUCOSE BLDC GLUCOMTR-MCNC: 239 MG/DL — HIGH (ref 70–99)
GLUCOSE BLDC GLUCOMTR-MCNC: 287 MG/DL — HIGH (ref 70–99)
GLUCOSE BLDC GLUCOMTR-MCNC: 308 MG/DL — HIGH (ref 70–99)
GLUCOSE SERPL-MCNC: 246 MG/DL — HIGH (ref 70–99)
HCT VFR BLD CALC: 37.8 % — LOW (ref 39–50)
HGB BLD-MCNC: 12.9 G/DL — LOW (ref 13–17)
LYMPHOCYTES # BLD AUTO: 0.9 K/UL — LOW (ref 1–3.3)
LYMPHOCYTES # BLD AUTO: 10.6 % — LOW (ref 13–44)
MCHC RBC-ENTMCNC: 30.7 PG — SIGNIFICANT CHANGE UP (ref 27–34)
MCHC RBC-ENTMCNC: 34.1 GM/DL — SIGNIFICANT CHANGE UP (ref 32–36)
MCV RBC AUTO: 90.2 FL — SIGNIFICANT CHANGE UP (ref 80–100)
MONOCYTES # BLD AUTO: 1 K/UL — HIGH (ref 0–0.9)
MONOCYTES NFR BLD AUTO: 11.2 % — SIGNIFICANT CHANGE UP (ref 2–14)
NEUTROPHILS # BLD AUTO: 6.9 K/UL — SIGNIFICANT CHANGE UP (ref 1.8–7.4)
NEUTROPHILS NFR BLD AUTO: 77.6 % — HIGH (ref 43–77)
PLATELET # BLD AUTO: 141 K/UL — LOW (ref 150–400)
POTASSIUM SERPL-MCNC: 4.5 MMOL/L — SIGNIFICANT CHANGE UP (ref 3.5–5.3)
POTASSIUM SERPL-SCNC: 4.5 MMOL/L — SIGNIFICANT CHANGE UP (ref 3.5–5.3)
RBC # BLD: 4.2 M/UL — SIGNIFICANT CHANGE UP (ref 4.2–5.8)
RBC # FLD: 12.9 % — SIGNIFICANT CHANGE UP (ref 10.3–14.5)
SODIUM SERPL-SCNC: 138 MMOL/L — SIGNIFICANT CHANGE UP (ref 135–145)
WBC # BLD: 8.9 K/UL — SIGNIFICANT CHANGE UP (ref 3.8–10.5)
WBC # FLD AUTO: 8.9 K/UL — SIGNIFICANT CHANGE UP (ref 3.8–10.5)

## 2018-06-07 RX ADMIN — Medication 3: at 17:48

## 2018-06-07 RX ADMIN — Medication 2: at 22:23

## 2018-06-07 RX ADMIN — SODIUM CHLORIDE 75 MILLILITER(S): 9 INJECTION, SOLUTION INTRAVENOUS at 17:49

## 2018-06-07 RX ADMIN — Medication 100 MILLIGRAM(S): at 05:07

## 2018-06-07 RX ADMIN — RISPERIDONE 1 MILLIGRAM(S): 4 TABLET ORAL at 22:22

## 2018-06-07 RX ADMIN — OXYCODONE HYDROCHLORIDE 10 MILLIGRAM(S): 5 TABLET ORAL at 13:05

## 2018-06-07 RX ADMIN — Medication 2: at 08:54

## 2018-06-07 RX ADMIN — SENNA PLUS 2 TABLET(S): 8.6 TABLET ORAL at 22:22

## 2018-06-07 RX ADMIN — OXYCODONE HYDROCHLORIDE 10 MILLIGRAM(S): 5 TABLET ORAL at 12:36

## 2018-06-07 RX ADMIN — Medication 2: at 12:35

## 2018-06-07 RX ADMIN — Medication 81 MILLIGRAM(S): at 12:36

## 2018-06-07 RX ADMIN — RISPERIDONE 1 MILLIGRAM(S): 4 TABLET ORAL at 08:54

## 2018-06-07 NOTE — PHYSICAL THERAPY INITIAL EVALUATION ADULT - BALANCE TRAINING, PT EVAL
Goal: Pt will improve balance to fair to improve performance and safety of standing activities, transfers and ambulation in 4 weeks.

## 2018-06-07 NOTE — PHYSICAL THERAPY INITIAL EVALUATION ADULT - PLANNED THERAPY INTERVENTIONS, PT EVAL
gait training/transfer training/Stairs Goal: Pt will go up/down 4 steps with + handrail independently in 4 weeks./bed mobility training/balance training/strengthening

## 2018-06-08 DIAGNOSIS — E11.65 TYPE 2 DIABETES MELLITUS WITH HYPERGLYCEMIA: ICD-10-CM

## 2018-06-08 LAB
ANION GAP SERPL CALC-SCNC: 13 MMOL/L — SIGNIFICANT CHANGE UP (ref 5–17)
BUN SERPL-MCNC: 11 MG/DL — SIGNIFICANT CHANGE UP (ref 7–23)
CALCIUM SERPL-MCNC: 8.8 MG/DL — SIGNIFICANT CHANGE UP (ref 8.4–10.5)
CHLORIDE SERPL-SCNC: 96 MMOL/L — SIGNIFICANT CHANGE UP (ref 96–108)
CO2 SERPL-SCNC: 28 MMOL/L — SIGNIFICANT CHANGE UP (ref 22–31)
CREAT SERPL-MCNC: 0.92 MG/DL — SIGNIFICANT CHANGE UP (ref 0.5–1.3)
GLUCOSE BLDC GLUCOMTR-MCNC: 237 MG/DL — HIGH (ref 70–99)
GLUCOSE BLDC GLUCOMTR-MCNC: 273 MG/DL — HIGH (ref 70–99)
GLUCOSE BLDC GLUCOMTR-MCNC: 274 MG/DL — HIGH (ref 70–99)
GLUCOSE BLDC GLUCOMTR-MCNC: 384 MG/DL — HIGH (ref 70–99)
GLUCOSE SERPL-MCNC: 265 MG/DL — HIGH (ref 70–99)
POTASSIUM SERPL-MCNC: 4.4 MMOL/L — SIGNIFICANT CHANGE UP (ref 3.5–5.3)
POTASSIUM SERPL-SCNC: 4.4 MMOL/L — SIGNIFICANT CHANGE UP (ref 3.5–5.3)
SODIUM SERPL-SCNC: 137 MMOL/L — SIGNIFICANT CHANGE UP (ref 135–145)

## 2018-06-08 PROCEDURE — 99222 1ST HOSP IP/OBS MODERATE 55: CPT

## 2018-06-08 PROCEDURE — 71045 X-RAY EXAM CHEST 1 VIEW: CPT | Mod: 26

## 2018-06-08 RX ORDER — INSULIN LISPRO 100/ML
4 VIAL (ML) SUBCUTANEOUS
Qty: 0 | Refills: 0 | Status: DISCONTINUED | OUTPATIENT
Start: 2018-06-08 | End: 2018-06-09

## 2018-06-08 RX ORDER — DEXTROSE 50 % IN WATER 50 %
25 SYRINGE (ML) INTRAVENOUS ONCE
Qty: 0 | Refills: 0 | Status: DISCONTINUED | OUTPATIENT
Start: 2018-06-08 | End: 2018-06-12

## 2018-06-08 RX ORDER — SODIUM CHLORIDE 9 MG/ML
1000 INJECTION, SOLUTION INTRAVENOUS
Qty: 0 | Refills: 0 | Status: DISCONTINUED | OUTPATIENT
Start: 2018-06-08 | End: 2018-06-12

## 2018-06-08 RX ORDER — INSULIN LISPRO 100/ML
6 VIAL (ML) SUBCUTANEOUS
Qty: 0 | Refills: 0 | Status: DISCONTINUED | OUTPATIENT
Start: 2018-06-08 | End: 2018-06-08

## 2018-06-08 RX ORDER — MIRTAZAPINE 45 MG/1
30 TABLET, ORALLY DISINTEGRATING ORAL AT BEDTIME
Qty: 0 | Refills: 0 | Status: DISCONTINUED | OUTPATIENT
Start: 2018-06-08 | End: 2018-06-12

## 2018-06-08 RX ORDER — INSULIN DETEMIR 100/ML (3)
34 INSULIN PEN (ML) SUBCUTANEOUS
Qty: 0 | Refills: 0 | Status: DISCONTINUED | OUTPATIENT
Start: 2018-06-08 | End: 2018-06-08

## 2018-06-08 RX ORDER — INSULIN GLARGINE 100 [IU]/ML
12 INJECTION, SOLUTION SUBCUTANEOUS AT BEDTIME
Qty: 0 | Refills: 0 | Status: DISCONTINUED | OUTPATIENT
Start: 2018-06-08 | End: 2018-06-09

## 2018-06-08 RX ORDER — DEXTROSE 50 % IN WATER 50 %
12.5 SYRINGE (ML) INTRAVENOUS ONCE
Qty: 0 | Refills: 0 | Status: DISCONTINUED | OUTPATIENT
Start: 2018-06-08 | End: 2018-06-12

## 2018-06-08 RX ORDER — RISPERIDONE 4 MG/1
0.25 TABLET ORAL THREE TIMES A DAY
Qty: 0 | Refills: 0 | Status: DISCONTINUED | OUTPATIENT
Start: 2018-06-08 | End: 2018-06-12

## 2018-06-08 RX ORDER — DEXTROSE 50 % IN WATER 50 %
15 SYRINGE (ML) INTRAVENOUS ONCE
Qty: 0 | Refills: 0 | Status: DISCONTINUED | OUTPATIENT
Start: 2018-06-08 | End: 2018-06-12

## 2018-06-08 RX ORDER — GLUCAGON INJECTION, SOLUTION 0.5 MG/.1ML
1 INJECTION, SOLUTION SUBCUTANEOUS ONCE
Qty: 0 | Refills: 0 | Status: DISCONTINUED | OUTPATIENT
Start: 2018-06-08 | End: 2018-06-12

## 2018-06-08 RX ADMIN — Medication 4 UNIT(S): at 17:27

## 2018-06-08 RX ADMIN — RISPERIDONE 0.25 MILLIGRAM(S): 4 TABLET ORAL at 17:27

## 2018-06-08 RX ADMIN — Medication 3: at 22:28

## 2018-06-08 RX ADMIN — SENNA PLUS 2 TABLET(S): 8.6 TABLET ORAL at 22:29

## 2018-06-08 RX ADMIN — OXYCODONE HYDROCHLORIDE 10 MILLIGRAM(S): 5 TABLET ORAL at 11:50

## 2018-06-08 RX ADMIN — OXYCODONE HYDROCHLORIDE 10 MILLIGRAM(S): 5 TABLET ORAL at 11:21

## 2018-06-08 RX ADMIN — MIRTAZAPINE 30 MILLIGRAM(S): 45 TABLET, ORALLY DISINTEGRATING ORAL at 22:29

## 2018-06-08 RX ADMIN — RISPERIDONE 0.25 MILLIGRAM(S): 4 TABLET ORAL at 22:29

## 2018-06-08 RX ADMIN — Medication 81 MILLIGRAM(S): at 11:17

## 2018-06-08 RX ADMIN — Medication 2: at 17:27

## 2018-06-08 RX ADMIN — Medication 3: at 11:16

## 2018-06-08 RX ADMIN — INSULIN GLARGINE 12 UNIT(S): 100 INJECTION, SOLUTION SUBCUTANEOUS at 22:28

## 2018-06-08 NOTE — CONSULT NOTE ADULT - PROBLEM SELECTOR RECOMMENDATION 9
Diabetes Education and Nutrition Eval  Start Lantus 12 units qhs  Start Humalog 4 units qac  Low correction scale qac + bedtime  Goal glucose 100-180  Outpt. endo follow-up  Outpt. optho, podiatry, nephrology  Plan to d/c on home regimen once doses are confirmed.

## 2018-06-08 NOTE — PROGRESS NOTE ADULT - PROBLEM SELECTOR PLAN 1
tolerated procedure well  DVT and GI prophylaxis  pain meds as needed  follow for oversedation  vigorous pulmonary toilet to prevent atelectasis

## 2018-06-08 NOTE — CONSULT NOTE ADULT - SUBJECTIVE AND OBJECTIVE BOX
HPI:  Attempted to reach family for additional medical history information, but no answer at this time.   84 y/o M w/ hx of Type 2 DM x 7 years with no reported microvascular complications. A1c 7.4%. Does not know home regimen. Has some dementia. Wife gives him insulin. He thinks he's on Lantus and metformin. He does not know doses. He denies taking any mealtime insulin at home. Checks glucose at home usually around 150. + occasional hypoglycemia with symptoms. Tries to monitor carbs. + diet soda. occasional juice. No polyuria, polydipsia, nausea or vomiting.   Presented to the [facility] ED following a fall at home. Patient was moving around the recycling, when he tripped and fell to the ground. His son found him on the ground and called EMS. No CP/SOB. No head strike or LOC. Patient is a community ambulator with no assistive devices. However, son and daughter report that he likely needs assistive devices. (06 Jun 2018 00:38)      PAST MEDICAL & SURGICAL HISTORY:  Dementia  DM (diabetes mellitus)  No significant past surgical history      FAMILY HISTORY:  Grandmother- Type 2 DM      Social History: No tobacco or alcohol use    Outpatient Medications:  Metformin  Lantus    MEDICATIONS  (STANDING):  aspirin enteric coated 81 milliGRAM(s) Oral daily  dextrose 5%. 1000 milliLiter(s) (50 mL/Hr) IV Continuous <Continuous>  dextrose 50% Injectable 12.5 Gram(s) IV Push once  dextrose 50% Injectable 25 Gram(s) IV Push once  dextrose 50% Injectable 25 Gram(s) IV Push once  insulin glargine Injectable (LANTUS) 12 Unit(s) SubCutaneous at bedtime  insulin lispro (HumaLOG) corrective regimen sliding scale   SubCutaneous three times a day before meals  insulin lispro (HumaLOG) corrective regimen sliding scale   SubCutaneous at bedtime  insulin lispro Injectable (HumaLOG) 4 Unit(s) SubCutaneous three times a day before meals  risperiDONE   Tablet 0.25 milliGRAM(s) Oral three times a day  senna 2 Tablet(s) Oral at bedtime    MEDICATIONS  (PRN):  acetaminophen   Tablet 650 milliGRAM(s) Oral every 6 hours PRN For Temp greater than 38 C (100.4 F)  dextrose 40% Gel 15 Gram(s) Oral once PRN Blood Glucose LESS THAN 70 milliGRAM(s)/deciliter  glucagon  Injectable 1 milliGRAM(s) IntraMuscular once PRN Glucose LESS THAN 70 milligrams/deciliter  ondansetron Injectable 4 milliGRAM(s) IV Push every 6 hours PRN Nausea and/or Vomiting  oxyCODONE    IR 10 milliGRAM(s) Oral every 4 hours PRN Moderate Pain  oxyCODONE    IR 5 milliGRAM(s) Oral every 4 hours PRN Mild Pain      Allergies    No Known Allergies    Intolerances      Review of Systems:  Constitutional: No fever, No change in weight  Eyes: No blurry vision  Neuro: No headache, No paresthesias  HEENT: No throat pain  Cardiovascular: No chest pain  Respiratory: No SOB  GI: No nausea or vomiting  : No polyuria  Skin: no rash  Psych: no depression  Endocrine: No polydipsia, No heat or cold intolerance, rest as noted in HPI  Hem/lymph: no swelling    All other review of systems negative      PHYSICAL EXAM:  VITALS: T(C): 37.2 (06-08-18 @ 12:42)  T(F): 99 (06-08-18 @ 12:42), Max: 99 (06-08-18 @ 12:42)  HR: 86 (06-08-18 @ 12:42) (79 - 86)  BP: 131/74 (06-08-18 @ 12:42) (131/74 - 140/80)  RR:  (17 - 17)  SpO2:  (95% - 97%)  Wt(kg): --  GENERAL: NAD at this time  EYES: No proptosis, EOMI  HEENT:  Atraumatic, Normocephalic,   THYROID: Normal size, no palpable nodules  RESPIRATORY: Clear to auscultation bilaterally, full excursion, non-labored  CARDIOVASCULAR: Regular rhythm; No murmurs; no peripheral edema  GI: Soft, nontender, non distended, normal bowel sounds  SKIN: Dry, intact, No rashes or lesions  MUSCULOSKELETAL: Decreased ROM in LE  NEURO: follows commands  PSYCH:  normal affect, normal mood  CUSHING'S SIGNS: no striae      POCT Blood Glucose.: 274 mg/dL (06-08-18 @ 12:14)  POCT Blood Glucose.: 273 mg/dL (06-08-18 @ 10:59)  POCT Blood Glucose.: 308 mg/dL (06-07-18 @ 22:07)  POCT Blood Glucose.: 287 mg/dL (06-07-18 @ 17:01)  POCT Blood Glucose.: 218 mg/dL (06-07-18 @ 12:15)  POCT Blood Glucose.: 233 mg/dL (06-07-18 @ 08:51)  POCT Blood Glucose.: 239 mg/dL (06-07-18 @ 07:44)  POCT Blood Glucose.: 216 mg/dL (06-06-18 @ 22:04)  POCT Blood Glucose.: 200 mg/dL (06-06-18 @ 19:13)  POCT Blood Glucose.: 177 mg/dL (06-06-18 @ 17:40)  POCT Blood Glucose.: 231 mg/dL (06-06-18 @ 12:17)  POCT Blood Glucose.: 239 mg/dL (06-06-18 @ 07:49)  POCT Blood Glucose.: 190 mg/dL (06-06-18 @ 01:26)                              12.9   8.9   )-----------( 141      ( 07 Jun 2018 09:26 )             37.8       06-08    137  |  96  |  11  ----------------------------<  265<H>  4.4   |  28  |  0.92    EGFR if : 89  EGFR if non : 77    Ca    8.8      06-08    TPro  7.4  /  Alb  3.9  /  TBili  0.2  /  DBili  x   /  AST  20  /  ALT  17  /  AlkPhos  67  06-05    Thyroid Function Tests:      Hemoglobin A1C, Whole Blood: 7.4 % <H> [4.0 - 5.6] (06-06-18 @ 15:45)        Radiology:

## 2018-06-09 LAB
GLUCOSE BLDC GLUCOMTR-MCNC: 208 MG/DL — HIGH (ref 70–99)
GLUCOSE BLDC GLUCOMTR-MCNC: 235 MG/DL — HIGH (ref 70–99)
GLUCOSE BLDC GLUCOMTR-MCNC: 250 MG/DL — HIGH (ref 70–99)
GLUCOSE BLDC GLUCOMTR-MCNC: 287 MG/DL — HIGH (ref 70–99)
GLUCOSE BLDC GLUCOMTR-MCNC: 315 MG/DL — HIGH (ref 70–99)

## 2018-06-09 RX ORDER — OXYCODONE HYDROCHLORIDE 5 MG/1
1 TABLET ORAL
Qty: 0 | Refills: 0 | COMMUNITY
Start: 2018-06-09

## 2018-06-09 RX ORDER — INSULIN GLARGINE 100 [IU]/ML
18 INJECTION, SOLUTION SUBCUTANEOUS AT BEDTIME
Qty: 0 | Refills: 0 | Status: DISCONTINUED | OUTPATIENT
Start: 2018-06-09 | End: 2018-06-10

## 2018-06-09 RX ORDER — ASPIRIN/CALCIUM CARB/MAGNESIUM 324 MG
1 TABLET ORAL
Qty: 30 | Refills: 0 | OUTPATIENT
Start: 2018-06-09 | End: 2018-07-08

## 2018-06-09 RX ORDER — ASPIRIN/CALCIUM CARB/MAGNESIUM 324 MG
1 TABLET ORAL
Qty: 0 | Refills: 0 | COMMUNITY
Start: 2018-06-09

## 2018-06-09 RX ORDER — INSULIN LISPRO 100/ML
6 VIAL (ML) SUBCUTANEOUS
Qty: 0 | Refills: 0 | Status: DISCONTINUED | OUTPATIENT
Start: 2018-06-09 | End: 2018-06-10

## 2018-06-09 RX ADMIN — Medication 3: at 17:10

## 2018-06-09 RX ADMIN — INSULIN GLARGINE 18 UNIT(S): 100 INJECTION, SOLUTION SUBCUTANEOUS at 22:48

## 2018-06-09 RX ADMIN — RISPERIDONE 0.25 MILLIGRAM(S): 4 TABLET ORAL at 13:02

## 2018-06-09 RX ADMIN — OXYCODONE HYDROCHLORIDE 10 MILLIGRAM(S): 5 TABLET ORAL at 14:40

## 2018-06-09 RX ADMIN — RISPERIDONE 0.25 MILLIGRAM(S): 4 TABLET ORAL at 05:46

## 2018-06-09 RX ADMIN — MIRTAZAPINE 30 MILLIGRAM(S): 45 TABLET, ORALLY DISINTEGRATING ORAL at 22:49

## 2018-06-09 RX ADMIN — Medication 4 UNIT(S): at 17:10

## 2018-06-09 RX ADMIN — Medication 4 UNIT(S): at 08:24

## 2018-06-09 RX ADMIN — Medication 81 MILLIGRAM(S): at 11:15

## 2018-06-09 RX ADMIN — Medication 4: at 13:01

## 2018-06-09 RX ADMIN — RISPERIDONE 0.25 MILLIGRAM(S): 4 TABLET ORAL at 22:49

## 2018-06-09 RX ADMIN — Medication 2: at 08:24

## 2018-06-09 RX ADMIN — OXYCODONE HYDROCHLORIDE 10 MILLIGRAM(S): 5 TABLET ORAL at 13:58

## 2018-06-09 RX ADMIN — Medication 4 UNIT(S): at 13:01

## 2018-06-09 RX ADMIN — SENNA PLUS 2 TABLET(S): 8.6 TABLET ORAL at 22:49

## 2018-06-09 NOTE — PROGRESS NOTE ADULT - PROBLEM SELECTOR PLAN 1
- Recommend Lantus 18 units qhs  - Recommend Humalog 6 units TID AC and low SSI AC and HS  - Our service will follow    Carmen Okeefe DO  Endocrine Fellow  Pager: [phone] - Recommend Lantus 18 units qhs  - Recommend Humalog 6 units TID AC and low SSI AC and HS  - Our service will follow  - D/c Plan to be determined based on insulin requirements and reviewing home regimen    Carmen Okeefe DO  Endocrine Fellow  Pager: [phone]

## 2018-06-10 LAB
GLUCOSE BLDC GLUCOMTR-MCNC: 153 MG/DL — HIGH (ref 70–99)
GLUCOSE BLDC GLUCOMTR-MCNC: 250 MG/DL — HIGH (ref 70–99)
GLUCOSE BLDC GLUCOMTR-MCNC: 271 MG/DL — HIGH (ref 70–99)
GLUCOSE BLDC GLUCOMTR-MCNC: 310 MG/DL — HIGH (ref 70–99)

## 2018-06-10 RX ORDER — INSULIN LISPRO 100/ML
8 VIAL (ML) SUBCUTANEOUS
Qty: 0 | Refills: 0 | Status: DISCONTINUED | OUTPATIENT
Start: 2018-06-10 | End: 2018-06-11

## 2018-06-10 RX ORDER — TRAMADOL HYDROCHLORIDE 50 MG/1
50 TABLET ORAL EVERY 6 HOURS
Qty: 0 | Refills: 0 | Status: DISCONTINUED | OUTPATIENT
Start: 2018-06-10 | End: 2018-06-12

## 2018-06-10 RX ORDER — INSULIN GLARGINE 100 [IU]/ML
22 INJECTION, SOLUTION SUBCUTANEOUS AT BEDTIME
Qty: 0 | Refills: 0 | Status: DISCONTINUED | OUTPATIENT
Start: 2018-06-10 | End: 2018-06-11

## 2018-06-10 RX ORDER — INSULIN LISPRO 100/ML
VIAL (ML) SUBCUTANEOUS
Qty: 0 | Refills: 0 | Status: DISCONTINUED | OUTPATIENT
Start: 2018-06-10 | End: 2018-06-12

## 2018-06-10 RX ORDER — ACETAMINOPHEN 500 MG
975 TABLET ORAL EVERY 6 HOURS
Qty: 0 | Refills: 0 | Status: DISCONTINUED | OUTPATIENT
Start: 2018-06-10 | End: 2018-06-12

## 2018-06-10 RX ORDER — INSULIN LISPRO 100/ML
VIAL (ML) SUBCUTANEOUS AT BEDTIME
Qty: 0 | Refills: 0 | Status: DISCONTINUED | OUTPATIENT
Start: 2018-06-10 | End: 2018-06-12

## 2018-06-10 RX ORDER — OXYCODONE HYDROCHLORIDE 5 MG/1
5 TABLET ORAL EVERY 4 HOURS
Qty: 0 | Refills: 0 | Status: DISCONTINUED | OUTPATIENT
Start: 2018-06-10 | End: 2018-06-12

## 2018-06-10 RX ADMIN — Medication 4: at 12:34

## 2018-06-10 RX ADMIN — Medication 975 MILLIGRAM(S): at 13:27

## 2018-06-10 RX ADMIN — Medication 8 UNIT(S): at 17:29

## 2018-06-10 RX ADMIN — OXYCODONE HYDROCHLORIDE 5 MILLIGRAM(S): 5 TABLET ORAL at 11:31

## 2018-06-10 RX ADMIN — Medication 6 UNIT(S): at 08:32

## 2018-06-10 RX ADMIN — OXYCODONE HYDROCHLORIDE 5 MILLIGRAM(S): 5 TABLET ORAL at 19:05

## 2018-06-10 RX ADMIN — Medication 6 UNIT(S): at 12:34

## 2018-06-10 RX ADMIN — RISPERIDONE 0.25 MILLIGRAM(S): 4 TABLET ORAL at 13:14

## 2018-06-10 RX ADMIN — Medication 2: at 08:32

## 2018-06-10 RX ADMIN — SENNA PLUS 2 TABLET(S): 8.6 TABLET ORAL at 23:54

## 2018-06-10 RX ADMIN — OXYCODONE HYDROCHLORIDE 5 MILLIGRAM(S): 5 TABLET ORAL at 18:52

## 2018-06-10 RX ADMIN — Medication 81 MILLIGRAM(S): at 11:31

## 2018-06-10 RX ADMIN — RISPERIDONE 0.25 MILLIGRAM(S): 4 TABLET ORAL at 23:53

## 2018-06-10 RX ADMIN — MIRTAZAPINE 30 MILLIGRAM(S): 45 TABLET, ORALLY DISINTEGRATING ORAL at 23:53

## 2018-06-10 RX ADMIN — INSULIN GLARGINE 22 UNIT(S): 100 INJECTION, SOLUTION SUBCUTANEOUS at 23:54

## 2018-06-10 RX ADMIN — RISPERIDONE 0.25 MILLIGRAM(S): 4 TABLET ORAL at 06:16

## 2018-06-10 RX ADMIN — OXYCODONE HYDROCHLORIDE 5 MILLIGRAM(S): 5 TABLET ORAL at 12:00

## 2018-06-10 RX ADMIN — Medication 6: at 17:28

## 2018-06-10 RX ADMIN — Medication 975 MILLIGRAM(S): at 14:00

## 2018-06-10 NOTE — PROGRESS NOTE ADULT - PROBLEM SELECTOR PLAN 1
- Recommend Lantus 22 units qhs  - Recommend Humalog 8 units TID AC and mod SSI AC and HS  - Our service will follow  - D/c Plan like basal/bolus, final doses to be determined.  - F/u Dr. Geovani Okeefe, DO  Endocrine Fellow  Pager: [phone]

## 2018-06-11 LAB
GLUCOSE BLDC GLUCOMTR-MCNC: 123 MG/DL — HIGH (ref 70–99)
GLUCOSE BLDC GLUCOMTR-MCNC: 186 MG/DL — HIGH (ref 70–99)
GLUCOSE BLDC GLUCOMTR-MCNC: 222 MG/DL — HIGH (ref 70–99)
GLUCOSE BLDC GLUCOMTR-MCNC: 234 MG/DL — HIGH (ref 70–99)
GLUCOSE BLDC GLUCOMTR-MCNC: 246 MG/DL — HIGH (ref 70–99)

## 2018-06-11 PROCEDURE — 99232 SBSQ HOSP IP/OBS MODERATE 35: CPT

## 2018-06-11 RX ORDER — INSULIN LISPRO 100/ML
10 VIAL (ML) SUBCUTANEOUS
Qty: 0 | Refills: 0 | Status: DISCONTINUED | OUTPATIENT
Start: 2018-06-11 | End: 2018-06-12

## 2018-06-11 RX ORDER — INSULIN GLARGINE 100 [IU]/ML
25 INJECTION, SOLUTION SUBCUTANEOUS AT BEDTIME
Qty: 0 | Refills: 0 | Status: DISCONTINUED | OUTPATIENT
Start: 2018-06-11 | End: 2018-06-12

## 2018-06-11 RX ADMIN — Medication 4: at 09:02

## 2018-06-11 RX ADMIN — OXYCODONE HYDROCHLORIDE 5 MILLIGRAM(S): 5 TABLET ORAL at 12:30

## 2018-06-11 RX ADMIN — Medication 81 MILLIGRAM(S): at 11:52

## 2018-06-11 RX ADMIN — OXYCODONE HYDROCHLORIDE 5 MILLIGRAM(S): 5 TABLET ORAL at 11:52

## 2018-06-11 RX ADMIN — Medication 8 UNIT(S): at 09:01

## 2018-06-11 RX ADMIN — Medication 4: at 12:29

## 2018-06-11 RX ADMIN — Medication 975 MILLIGRAM(S): at 06:39

## 2018-06-11 RX ADMIN — RISPERIDONE 0.25 MILLIGRAM(S): 4 TABLET ORAL at 23:10

## 2018-06-11 RX ADMIN — RISPERIDONE 0.25 MILLIGRAM(S): 4 TABLET ORAL at 06:39

## 2018-06-11 RX ADMIN — Medication 8 UNIT(S): at 12:29

## 2018-06-11 RX ADMIN — Medication 10 UNIT(S): at 20:46

## 2018-06-11 RX ADMIN — INSULIN GLARGINE 25 UNIT(S): 100 INJECTION, SOLUTION SUBCUTANEOUS at 23:10

## 2018-06-11 RX ADMIN — RISPERIDONE 0.25 MILLIGRAM(S): 4 TABLET ORAL at 14:10

## 2018-06-11 RX ADMIN — SENNA PLUS 2 TABLET(S): 8.6 TABLET ORAL at 23:10

## 2018-06-11 RX ADMIN — Medication 975 MILLIGRAM(S): at 08:15

## 2018-06-11 NOTE — PROGRESS NOTE ADULT - PROBLEM SELECTOR PLAN 1
tolerated procedure well  DVT and GI prophylaxis  pain meds as needed  follow for oversedation  awaiting DC planning to rehab  vigorous pulmonary toilet to prevent atelectasis

## 2018-06-11 NOTE — PROGRESS NOTE ADULT - PROBLEM SELECTOR PLAN 1
-Test BG ac and hs  -Increase Lantus to 25 qhs plus Humalog 10 units ac meals  -Continue moderate Humalog correction scales ac and hs for now  -Upon discharge will continue basal bolus insulin plus Metformin. Doses TBD  -Pt to f/u with endocrinologist Dr Molina > Tried to contact office but got disconnected x2. [phone]  -Plan discussed with pt/team.  Contact info: [phone] (24/7). pager [phone]

## 2018-06-12 VITALS
SYSTOLIC BLOOD PRESSURE: 129 MMHG | TEMPERATURE: 98 F | RESPIRATION RATE: 18 BRPM | OXYGEN SATURATION: 94 % | HEART RATE: 96 BPM | DIASTOLIC BLOOD PRESSURE: 78 MMHG

## 2018-06-12 LAB
GLUCOSE BLDC GLUCOMTR-MCNC: 135 MG/DL — HIGH (ref 70–99)
GLUCOSE BLDC GLUCOMTR-MCNC: 139 MG/DL — HIGH (ref 70–99)
GLUCOSE BLDC GLUCOMTR-MCNC: 164 MG/DL — HIGH (ref 70–99)

## 2018-06-12 PROCEDURE — 70450 CT HEAD/BRAIN W/O DYE: CPT

## 2018-06-12 PROCEDURE — 99285 EMERGENCY DEPT VISIT HI MDM: CPT | Mod: 25

## 2018-06-12 PROCEDURE — 85610 PROTHROMBIN TIME: CPT

## 2018-06-12 PROCEDURE — 82962 GLUCOSE BLOOD TEST: CPT

## 2018-06-12 PROCEDURE — 96374 THER/PROPH/DIAG INJ IV PUSH: CPT

## 2018-06-12 PROCEDURE — 73502 X-RAY EXAM HIP UNI 2-3 VIEWS: CPT

## 2018-06-12 PROCEDURE — 85027 COMPLETE CBC AUTOMATED: CPT

## 2018-06-12 PROCEDURE — 72170 X-RAY EXAM OF PELVIS: CPT

## 2018-06-12 PROCEDURE — 97530 THERAPEUTIC ACTIVITIES: CPT

## 2018-06-12 PROCEDURE — 73552 X-RAY EXAM OF FEMUR 2/>: CPT

## 2018-06-12 PROCEDURE — 85730 THROMBOPLASTIN TIME PARTIAL: CPT

## 2018-06-12 PROCEDURE — 80048 BASIC METABOLIC PNL TOTAL CA: CPT

## 2018-06-12 PROCEDURE — 83036 HEMOGLOBIN GLYCOSYLATED A1C: CPT

## 2018-06-12 PROCEDURE — 71045 X-RAY EXAM CHEST 1 VIEW: CPT

## 2018-06-12 PROCEDURE — 73560 X-RAY EXAM OF KNEE 1 OR 2: CPT

## 2018-06-12 PROCEDURE — 97116 GAIT TRAINING THERAPY: CPT

## 2018-06-12 PROCEDURE — 96375 TX/PRO/DX INJ NEW DRUG ADDON: CPT

## 2018-06-12 PROCEDURE — 97162 PT EVAL MOD COMPLEX 30 MIN: CPT

## 2018-06-12 PROCEDURE — 76000 FLUOROSCOPY <1 HR PHYS/QHP: CPT

## 2018-06-12 PROCEDURE — 93005 ELECTROCARDIOGRAM TRACING: CPT

## 2018-06-12 PROCEDURE — 80053 COMPREHEN METABOLIC PANEL: CPT

## 2018-06-12 PROCEDURE — 97110 THERAPEUTIC EXERCISES: CPT

## 2018-06-12 PROCEDURE — 72125 CT NECK SPINE W/O DYE: CPT

## 2018-06-12 PROCEDURE — 86900 BLOOD TYPING SEROLOGIC ABO: CPT

## 2018-06-12 PROCEDURE — 86850 RBC ANTIBODY SCREEN: CPT

## 2018-06-12 PROCEDURE — C1713: CPT

## 2018-06-12 PROCEDURE — 86901 BLOOD TYPING SEROLOGIC RH(D): CPT

## 2018-06-12 RX ORDER — RISPERIDONE 4 MG/1
1 TABLET ORAL
Qty: 0 | Refills: 0 | COMMUNITY
Start: 2018-06-12

## 2018-06-12 RX ORDER — INSULIN DETEMIR 100/ML (3)
34 INSULIN PEN (ML) SUBCUTANEOUS
Qty: 0 | Refills: 0 | COMMUNITY

## 2018-06-12 RX ORDER — ATORVASTATIN CALCIUM 80 MG/1
1 TABLET, FILM COATED ORAL
Qty: 0 | Refills: 0 | COMMUNITY
Start: 2018-06-12

## 2018-06-12 RX ORDER — INSULIN LISPRO 100/ML
10 VIAL (ML) SUBCUTANEOUS
Qty: 0 | Refills: 0 | Status: DISCONTINUED | OUTPATIENT
Start: 2018-06-12 | End: 2018-06-12

## 2018-06-12 RX ORDER — SENNA PLUS 8.6 MG/1
2 TABLET ORAL
Qty: 0 | Refills: 0 | COMMUNITY
Start: 2018-06-12

## 2018-06-12 RX ORDER — RISPERIDONE 4 MG/1
0 TABLET ORAL
Qty: 0 | Refills: 0 | COMMUNITY

## 2018-06-12 RX ORDER — DOCUSATE SODIUM 100 MG
1 CAPSULE ORAL
Qty: 0 | Refills: 0 | COMMUNITY

## 2018-06-12 RX ORDER — ACETAMINOPHEN 500 MG
3 TABLET ORAL
Qty: 0 | Refills: 0 | COMMUNITY
Start: 2018-06-12

## 2018-06-12 RX ORDER — PANTOPRAZOLE SODIUM 20 MG/1
1 TABLET, DELAYED RELEASE ORAL
Qty: 0 | Refills: 0 | COMMUNITY

## 2018-06-12 RX ORDER — INSULIN ASPART 100 [IU]/ML
6 INJECTION, SOLUTION SUBCUTANEOUS
Qty: 0 | Refills: 0 | COMMUNITY

## 2018-06-12 RX ORDER — INSULIN LISPRO 100/ML
10 VIAL (ML) SUBCUTANEOUS
Qty: 0 | Refills: 0 | COMMUNITY

## 2018-06-12 RX ORDER — INSULIN GLARGINE 100 [IU]/ML
25 INJECTION, SOLUTION SUBCUTANEOUS
Qty: 0 | Refills: 0 | COMMUNITY

## 2018-06-12 RX ORDER — MIRTAZAPINE 45 MG/1
0 TABLET, ORALLY DISINTEGRATING ORAL
Qty: 0 | Refills: 0 | COMMUNITY

## 2018-06-12 RX ORDER — ATORVASTATIN CALCIUM 80 MG/1
10 TABLET, FILM COATED ORAL AT BEDTIME
Qty: 0 | Refills: 0 | Status: DISCONTINUED | OUTPATIENT
Start: 2018-06-12 | End: 2018-06-12

## 2018-06-12 RX ORDER — METFORMIN HYDROCHLORIDE 850 MG/1
0 TABLET ORAL
Qty: 0 | Refills: 0 | COMMUNITY

## 2018-06-12 RX ADMIN — Medication 10 UNIT(S): at 17:50

## 2018-06-12 RX ADMIN — RISPERIDONE 0.25 MILLIGRAM(S): 4 TABLET ORAL at 13:37

## 2018-06-12 RX ADMIN — Medication 10 UNIT(S): at 10:33

## 2018-06-12 RX ADMIN — Medication 81 MILLIGRAM(S): at 13:36

## 2018-06-12 RX ADMIN — Medication 2: at 17:50

## 2018-06-12 RX ADMIN — Medication 10 UNIT(S): at 13:36

## 2018-06-12 NOTE — PROGRESS NOTE ADULT - PROBLEM SELECTOR PROBLEM 2
DM (diabetes mellitus)

## 2018-06-12 NOTE — PROGRESS NOTE ADULT - PROBLEM SELECTOR PLAN 2
Patient on a consistent carbohydrate diet  follow glucose  adjust insulin as needed  Endocrine folowing
Patient on a consistent carbohydrate diet  follow glucose  adjust insulin as needed
Patient on a consistent carbohydrate diet  follow glucose  adjust insulin as needed  Endocrine folowing
Patient on a consistent carbohydrate diet  follow glucose  adjust insulin as needed  Endocrine folowing
continue to follow glucose   adjust insulin as needed

## 2018-06-12 NOTE — PROGRESS NOTE ADULT - SUBJECTIVE AND OBJECTIVE BOX
Patient is a 83y old  Male who presents with a chief complaint of Hip Fracture (06 Jun 2018 09:48)      HPI:  Patine is s/p CRPP of hip om 06/06/18 .  Tolerated surgery well.  No co/o hip  pain no sob chest pain .  Baseline dementia, with no recurrent agitation.  Physical therapy instituted, but with limited ability to walk. The patient complains of severe right leg pain and weakness.      MEDICATIONS  (STANDING):  aspirin enteric coated 81 milliGRAM(s) Oral daily  atorvastatin 10 milliGRAM(s) Oral at bedtime  dextrose 5%. 1000 milliLiter(s) (50 mL/Hr) IV Continuous <Continuous>  dextrose 50% Injectable 12.5 Gram(s) IV Push once  dextrose 50% Injectable 25 Gram(s) IV Push once  dextrose 50% Injectable 25 Gram(s) IV Push once  insulin glargine Injectable (LANTUS) 25 Unit(s) SubCutaneous at bedtime  insulin lispro (HumaLOG) corrective regimen sliding scale   SubCutaneous three times a day before meals  insulin lispro (HumaLOG) corrective regimen sliding scale   SubCutaneous at bedtime  insulin lispro Injectable (HumaLOG) 10 Unit(s) SubCutaneous three times a day before meals  risperiDONE   Tablet 0.25 milliGRAM(s) Oral three times a day  senna 2 Tablet(s) Oral at bedtime    MEDICATIONS  (PRN):  acetaminophen   Tablet 650 milliGRAM(s) Oral every 6 hours PRN For Temp greater than 38 C (100.4 F)  acetaminophen   Tablet. 975 milliGRAM(s) Oral every 6 hours PRN Mild Pain (1 - 3)  dextrose 40% Gel 15 Gram(s) Oral once PRN Blood Glucose LESS THAN 70 milliGRAM(s)/deciliter  glucagon  Injectable 1 milliGRAM(s) IntraMuscular once PRN Glucose LESS THAN 70 milligrams/deciliter  ondansetron Injectable 4 milliGRAM(s) IV Push every 6 hours PRN Nausea and/or Vomiting  oxyCODONE    IR 5 milliGRAM(s) Oral every 4 hours PRN Severe Pain (7 - 10)  traMADol 50 milliGRAM(s) Oral every 6 hours PRN Moderate Pain (4 - 6)    Vital Signs Last 24 Hrs  T(C): 36.6 (12 Jun 2018 16:23), Max: 37.2 (11 Jun 2018 21:34)  T(F): 97.9 (12 Jun 2018 16:23), Max: 98.9 (11 Jun 2018 21:34)  HR: 96 (12 Jun 2018 16:23) (95 - 98)  BP: 129/78 (12 Jun 2018 16:23) (118/71 - 155/89)  BP(mean): --  RR: 18 (12 Jun 2018 16:23) (18 - 18)  SpO2: 94% (12 Jun 2018 16:23) (94% - 98%)        PHYSICAL EXAM:  GENERAL: NAD, well nourished and conversant  HEAD:  Atraumatic  EYES: EOM, PERRLA, conjunctiva pink and sclera white  ENT: No tonsillar erythema, exudates, or enlargement, moist mucous membranes, good dentition, no lesions  NECK: Supple, No JVD, normal thyroid, carotids with normal upstrokes and no bruits  CHEST/LUNG: Clear to auscultation bilaterally, No rales, rhonchi, wheezing, or rubs  HEART: Regular rate and rhythm, No murmurs, rubs, or gallops  ABDOMEN: Soft, nondistended, no masses, guarding, tenderness or rebound, bowel sounds present  EXTREMITIES:  2+ Peripheral Pulses, No clubbing, cyanosis, or edema.   S/P Hip pining. 4+ right hip swelling and 3+ tenderness to touch post-op. No erythema or incisional drainage.    SKIN: No rashes or lesions  NERVOUS SYSTEM: Abnormal cognitive function with senile dementia. Motor Strength 5/5 right upper and 2/5 right lower.  5/5 left upper and left lower extremities, DTRs 2+ intact and symmetric    LABS:    No labs obtained today	      CAPILLARY BLOOD GLUCOSE      POCT Blood Glucose.: 234 mg/dL (11 Jun 2018 21:33)  POCT Blood Glucose.: 186 mg/dL (11 Jun 2018 20:16)  POCT Blood Glucose.: 123 mg/dL (11 Jun 2018 17:38)  POCT Blood Glucose.: 246 mg/dL (11 Jun 2018 12:14)  POCT Blood Glucose.: 222 mg/dL (11 Jun 2018 08:20)    Blood sugars are becoming better controlled.
83 year old male, PMH Dementia, presented to the [facility] ED following a fall at home. Patient was moving around the recycling, when he tripped and fell to the ground. His son found him on the ground and called EMS. No CP/SOB. No head strike or LOC. Patient is a community ambulator with no assistive devices. However, son and daughter report that he likely needs assistive devices. Seen now resting comfortable s/p Orif of hip      MEDICATIONS  (STANDING):  aspirin enteric coated 81 milliGRAM(s) Oral daily  insulin lispro (HumaLOG) corrective regimen sliding scale   SubCutaneous three times a day before meals  insulin lispro (HumaLOG) corrective regimen sliding scale   SubCutaneous at bedtime  lactated ringers. 1000 milliLiter(s) (75 mL/Hr) IV Continuous <Continuous>  risperiDONE   Tablet 1 milliGRAM(s) Oral two times a day  senna 2 Tablet(s) Oral at bedtime    MEDICATIONS  (PRN):  acetaminophen   Tablet 650 milliGRAM(s) Oral every 6 hours PRN For Temp greater than 38 C (100.4 F)  ondansetron Injectable 4 milliGRAM(s) IV Push every 6 hours PRN Nausea and/or Vomiting  oxyCODONE    IR 10 milliGRAM(s) Oral every 4 hours PRN Moderate Pain  oxyCODONE    IR 5 milliGRAM(s) Oral every 4 hours PRN Mild Pain          VITALS:   T(C): 36.8 (06-07-18 @ 10:37), Max: 37.6 (06-06-18 @ 21:00)  HR: 84 (06-07-18 @ 12:05) (72 - 88)  BP: 154/85 (06-07-18 @ 12:05) (131/63 - 170/74)  RR: 18 (06-07-18 @ 10:37) (14 - 18)  SpO2: 95% (06-07-18 @ 12:05) (93% - 99%)  Wt(kg): --      PHYSICAL EXAM  General: WN/WD NAD  Neurology: A&Ox3, nonfocal, NELSON x 4  Eyes: PERRLA/ EOMI, Gross vision intact  ENT/Neck: Neck supple, trachea midline, No JVD, Gross hearing intact  Respiratory: CTA B/L, No wheezing, rales, rhonchi  CV: RRR, S1S2, no murmurs, rubs or gallops  Abdominal: Soft, NT, ND +BS,   Extremities: RLE shortening and external rotation  Skin: No Rashes, Hematoma, Ecchymosis    LABS:        CBC Full  -  ( 07 Jun 2018 09:26 )  WBC Count : 8.9 K/uL  Hemoglobin : 12.9 g/dL  Hematocrit : 37.8 %  Platelet Count - Automated : 141 K/uL  Mean Cell Volume : 90.2 fl  Mean Cell Hemoglobin : 30.7 pg  Mean Cell Hemoglobin Concentration : 34.1 gm/dL  Auto Neutrophil # : 6.9 K/uL  Auto Lymphocyte # : 0.9 K/uL  Auto Monocyte # : 1.0 K/uL  Auto Eosinophil # : 0.0 K/uL  Auto Basophil # : 0.0 K/uL  Auto Neutrophil % : 77.6 %  Auto Lymphocyte % : 10.6 %  Auto Monocyte % : 11.2 %  Auto Eosinophil % : 0.2 %  Auto Basophil % : 0.4 %    06-07    138  |  98  |  16  ----------------------------<  246<H>  4.5   |  23  |  1.06    Ca    8.5      07 Jun 2018 09:21    TPro  7.4  /  Alb  3.9  /  TBili  0.2  /  DBili  x   /  AST  20  /  ALT  17  /  AlkPhos  67  06-05    LIVER FUNCTIONS - ( 05 Jun 2018 21:39 )  Alb: 3.9 g/dL / Pro: 7.4 g/dL / ALK PHOS: 67 U/L / ALT: 17 U/L / AST: 20 U/L / GGT: x           PT/INR - ( 06 Jun 2018 09:50 )   PT: 13.1 sec;   INR: 1.20 ratio         PTT - ( 06 Jun 2018 09:50 )  PTT:34.9 sec    CAPILLARY BLOOD GLUCOSE      POCT Blood Glucose.: 287 mg/dL (07 Jun 2018 17:01)  POCT Blood Glucose.: 218 mg/dL (07 Jun 2018 12:15)  POCT Blood Glucose.: 233 mg/dL (07 Jun 2018 08:51)  POCT Blood Glucose.: 239 mg/dL (07 Jun 2018 07:44)  POCT Blood Glucose.: 216 mg/dL (06 Jun 2018 22:04)  POCT Blood Glucose.: 200 mg/dL (06 Jun 2018 19:13)  POCT Blood Glucose.: 177 mg/dL (06 Jun 2018 17:40)      RADIOLOGY & ADDITIONAL TESTS:
Chief Complaint/Follow-up on:  T2DM Management    Subjective: 84 y/o with uncontrolled T2DM here with fall. noted to have persistent hyperglycemia    MEDICATIONS  (STANDING):  aspirin enteric coated 81 milliGRAM(s) Oral daily  dextrose 5%. 1000 milliLiter(s) (50 mL/Hr) IV Continuous <Continuous>  dextrose 50% Injectable 12.5 Gram(s) IV Push once  dextrose 50% Injectable 25 Gram(s) IV Push once  dextrose 50% Injectable 25 Gram(s) IV Push once  insulin glargine Injectable (LANTUS) 12 Unit(s) SubCutaneous at bedtime  insulin lispro (HumaLOG) corrective regimen sliding scale   SubCutaneous three times a day before meals  insulin lispro (HumaLOG) corrective regimen sliding scale   SubCutaneous at bedtime  insulin lispro Injectable (HumaLOG) 4 Unit(s) SubCutaneous three times a day before meals  mirtazapine 30 milliGRAM(s) Oral at bedtime  risperiDONE   Tablet 0.25 milliGRAM(s) Oral three times a day  senna 2 Tablet(s) Oral at bedtime    MEDICATIONS  (PRN):  acetaminophen   Tablet 650 milliGRAM(s) Oral every 6 hours PRN For Temp greater than 38 C (100.4 F)  dextrose 40% Gel 15 Gram(s) Oral once PRN Blood Glucose LESS THAN 70 milliGRAM(s)/deciliter  glucagon  Injectable 1 milliGRAM(s) IntraMuscular once PRN Glucose LESS THAN 70 milligrams/deciliter  ondansetron Injectable 4 milliGRAM(s) IV Push every 6 hours PRN Nausea and/or Vomiting  oxyCODONE    IR 10 milliGRAM(s) Oral every 4 hours PRN Moderate Pain  oxyCODONE    IR 5 milliGRAM(s) Oral every 4 hours PRN Mild Pain      PHYSICAL EXAM:  VITALS: T(C): 36.9 (06-09-18 @ 14:40)  T(F): 98.5 (06-09-18 @ 14:40), Max: 98.7 (06-09-18 @ 04:36)  HR: 104 (06-09-18 @ 14:40) (85 - 104)  BP: 121/- (06-09-18 @ 14:40) (121/- - 137/79)  RR:  (17 - 18)  SpO2:  (96% - 97%)    GENERAL: NAD, well-groomed, well-developed  EYES: No proptosis, no injection  HEENT:  Atraumatic, Normocephalic, moist mucous membranes  THYROID: Normal size, no palpable nodules  RESPIRATORY: Clear to auscultation bilaterally; No rales, rhonchi, wheezing, or rubs  CARDIOVASCULAR: Regular rate and rhythm; No murmurs; no peripheral edema  GI: Soft, nontender, non distended, normal bowel sounds  CUSHING'S SIGNS: no striae    POCT Blood Glucose.: 287 mg/dL (06-09-18 @ 16:40)  POCT Blood Glucose.: 315 mg/dL (06-09-18 @ 12:54)  POCT Blood Glucose.: 208 mg/dL (06-09-18 @ 11:38)  POCT Blood Glucose.: 250 mg/dL (06-09-18 @ 08:07)  POCT Blood Glucose.: 384 mg/dL (06-08-18 @ 21:58)  POCT Blood Glucose.: 237 mg/dL (06-08-18 @ 16:38)  POCT Blood Glucose.: 274 mg/dL (06-08-18 @ 12:14)  POCT Blood Glucose.: 273 mg/dL (06-08-18 @ 10:59)  POCT Blood Glucose.: 308 mg/dL (06-07-18 @ 22:07)  POCT Blood Glucose.: 287 mg/dL (06-07-18 @ 17:01)  POCT Blood Glucose.: 218 mg/dL (06-07-18 @ 12:15)  POCT Blood Glucose.: 233 mg/dL (06-07-18 @ 08:51)  POCT Blood Glucose.: 239 mg/dL (06-07-18 @ 07:44)  POCT Blood Glucose.: 216 mg/dL (06-06-18 @ 22:04)  POCT Blood Glucose.: 200 mg/dL (06-06-18 @ 19:13)    06-08    137  |  96  |  11  ----------------------------<  265<H>  4.4   |  28  |  0.92    EGFR if : 89  EGFR if non : 77    Ca    8.8      06-08            Thyroid Function Tests:      Hemoglobin A1C, Whole Blood: 7.4 % <H> [4.0 - 5.6] (06-06-18 @ 15:45)
Chief Complaint/Follow-up on: T2DM    Subjective: Patient seen with son at bedside. Eating well, no nausea, vomiting or diarrhea.   As per son, Endo is Dr. Olivo. At home, son doses insulin pens and patient administers insulin, sometimes only in 2 quadrants and does not rotate site. He takes Levemir 34 units QAM and Novolog with breakfast only, rarely with lunch or dinner and sometimes at bedtime. Takes Novolog 4 units for FS>100, 5 units for FS>100-150, 6 units for FS>150. Occasionally 2 units if FS in 200s at bedtime. Son tries to give patient carb consistent diet. He also takes Metformin 1000 mg BID. T2DM diagnosed in 1980s.    MEDICATIONS  (STANDING):  aspirin enteric coated 81 milliGRAM(s) Oral daily  dextrose 5%. 1000 milliLiter(s) (50 mL/Hr) IV Continuous <Continuous>  dextrose 50% Injectable 12.5 Gram(s) IV Push once  dextrose 50% Injectable 25 Gram(s) IV Push once  dextrose 50% Injectable 25 Gram(s) IV Push once  insulin glargine Injectable (LANTUS) 18 Unit(s) SubCutaneous at bedtime  insulin lispro (HumaLOG) corrective regimen sliding scale   SubCutaneous three times a day before meals  insulin lispro (HumaLOG) corrective regimen sliding scale   SubCutaneous at bedtime  insulin lispro Injectable (HumaLOG) 6 Unit(s) SubCutaneous three times a day before meals  mirtazapine 30 milliGRAM(s) Oral at bedtime  risperiDONE   Tablet 0.25 milliGRAM(s) Oral three times a day  senna 2 Tablet(s) Oral at bedtime    MEDICATIONS  (PRN):  acetaminophen   Tablet 650 milliGRAM(s) Oral every 6 hours PRN For Temp greater than 38 C (100.4 F)  acetaminophen   Tablet. 975 milliGRAM(s) Oral every 6 hours PRN Mild Pain (1 - 3)  dextrose 40% Gel 15 Gram(s) Oral once PRN Blood Glucose LESS THAN 70 milliGRAM(s)/deciliter  glucagon  Injectable 1 milliGRAM(s) IntraMuscular once PRN Glucose LESS THAN 70 milligrams/deciliter  ondansetron Injectable 4 milliGRAM(s) IV Push every 6 hours PRN Nausea and/or Vomiting  oxyCODONE    IR 5 milliGRAM(s) Oral every 4 hours PRN Severe Pain (7 - 10)  traMADol 50 milliGRAM(s) Oral every 6 hours PRN Moderate Pain (4 - 6)      PHYSICAL EXAM:  VITALS: T(C): 37 (06-10-18 @ 12:10)  T(F): 98.6 (06-10-18 @ 12:10), Max: 100.2 (06-09-18 @ 21:29)  HR: 91 (06-10-18 @ 12:10) (89 - 104)  BP: 146/76 (06-10-18 @ 12:10) (121/- - 159/80)  RR:  (18 - 18)  SpO2:  (96% - 96%)    GENERAL: NAD, well-groomed, well-developed, normal weight  EYES: No proptosis, no injection  HEENT:  Atraumatic, Normocephalic, moist mucous membranes  THYROID: Normal size, no palpable nodules  RESPIRATORY: Clear to auscultation bilaterally; No rales, rhonchi, wheezing, or rubs  CARDIOVASCULAR: Regular rate and rhythm; No murmurs; no peripheral edema  GI: Soft, nontender, non distended, normal bowel sounds  CUSHING'S SIGNS: no striae    POCT Blood Glucose.: 310 mg/dL (06-10-18 @ 11:50)  POCT Blood Glucose.: 250 mg/dL (06-10-18 @ 08:19)    POCT Blood Glucose.: 235 mg/dL (06-09-18 @ 22:07)  POCT Blood Glucose.: 287 mg/dL (06-09-18 @ 16:40)  POCT Blood Glucose.: 315 mg/dL (06-09-18 @ 12:54)  POCT Blood Glucose.: 208 mg/dL (06-09-18 @ 11:38)  POCT Blood Glucose.: 250 mg/dL (06-09-18 @ 08:07)  POCT Blood Glucose.: 384 mg/dL (06-08-18 @ 21:58)  POCT Blood Glucose.: 237 mg/dL (06-08-18 @ 16:38)  POCT Blood Glucose.: 274 mg/dL (06-08-18 @ 12:14)  POCT Blood Glucose.: 273 mg/dL (06-08-18 @ 10:59)  POCT Blood Glucose.: 308 mg/dL (06-07-18 @ 22:07)  POCT Blood Glucose.: 287 mg/dL (06-07-18 @ 17:01)    06-08    137  |  96  |  11  ----------------------------<  265<H>  4.4   |  28  |  0.92    EGFR if : 89  EGFR if non : 77    Ca    8.8      06-08    Hemoglobin A1C, Whole Blood: 7.4 % <H> [4.0 - 5.6] (06-06-18 @ 15:45)
DIABETES FOLLOW UP NOTE: Saw pt earlier today  INTERVAL HX: 84 y/o M w/h/o T2DM on basal/bolus plus Metformin PTA. Also with dementia> here s/p fall found to have hip fx. Pt is not a candidate for surgery and will likely go to rehab as per primary team. Pt unable to answer questions but per son and staff pt tolerating POs. Glycemic control remains above goal while on present insulin doses. No hypoglycemia.      Review of Systems: Per son and staff> c/o pain in R hip>on pain meds  Cardiovascular: No chest pain, palpitations  Respiratory: No SOB, no cough  GI: No nausea, vomiting, abdominal pain  Endocrine: no polyuria, polydipsia or S&Sx of hypoglycemia    Allergies    No Known Allergies    Intolerances      MEDICATIONS:  insulin glargine Injectable (LANTUS) 22 Unit(s) SubCutaneous at bedtime  insulin lispro (HumaLOG) corrective regimen sliding scale   SubCutaneous three times a day before meals  insulin lispro (HumaLOG) corrective regimen sliding scale   SubCutaneous at bedtime  insulin lispro Injectable (HumaLOG) 8 Unit(s) SubCutaneous three times a day before meals  mirtazapine 30 milliGRAM(s) Oral at bedtime  risperiDONE   Tablet 0.25 milliGRAM(s) Oral three times a day        PHYSICAL EXAM:  VITALS: T(C): 36.8 (06-11-18 @ 14:30)  T(F): 98.2 (06-11-18 @ 14:30), Max: 99 (06-11-18 @ 00:33)  HR: 98 (06-11-18 @ 14:30) (85 - 102)  BP: 148/78 (06-11-18 @ 14:30) (127/72 - 156/81)  RR:  (16 - 18)  SpO2:  (95% - 97%)  Wt(kg): --  GENERAL: Male laying in bed in NAD. Son at bedside  Abdomen: Soft, nontender, non distended  Extremities: Warm, no edema noted in LEs  NEURO: Alert but unable to answer questions. Can report pain.      LABS:  POCT Blood Glucose.: 246 mg/dL (06-11-18 @ 12:14)  POCT Blood Glucose.: 222 mg/dL (06-11-18 @ 08:20)  POCT Blood Glucose.: 153 mg/dL (06-10-18 @ 22:18)  POCT Blood Glucose.: 271 mg/dL (06-10-18 @ 16:32)  POCT Blood Glucose.: 310 mg/dL (06-10-18 @ 11:50)  POCT Blood Glucose.: 250 mg/dL (06-10-18 @ 08:19)  POCT Blood Glucose.: 235 mg/dL (06-09-18 @ 22:07)  POCT Blood Glucose.: 287 mg/dL (06-09-18 @ 16:40)  POCT Blood Glucose.: 315 mg/dL (06-09-18 @ 12:54)  POCT Blood Glucose.: 208 mg/dL (06-09-18 @ 11:38)  POCT Blood Glucose.: 250 mg/dL (06-09-18 @ 08:07)  POCT Blood Glucose.: 384 mg/dL (06-08-18 @ 21:58)  POCT Blood Glucose.: 237 mg/dL (06-08-18 @ 16:38)    Hemoglobin A1C, Whole Blood: 7.4 % <H> [4.0 - 5.6] (06-06-18 @ 15:45)
Ortho Progress Note    S: Patient seen and examined. No acute events overnight. Pain well controlled with current regimen. Denies lightheadedness/dizziness, CP/SOB. Tolerating diet.       O:  Physical Exam:  Gen: Laying in bed, NAD, alert, but confused, changing answers to questions  Resp: Unlabored breathing  Ext: EHL/FHL/TA/Sol intact          + SILT DP/SP/FRIAS/Sa          +DP, extremity WWP    Vital Signs Last 24 Hrs  T(C): 37.7 (06 Jun 2018 04:57), Max: 37.7 (06 Jun 2018 04:57)  T(F): 99.8 (06 Jun 2018 04:57), Max: 99.8 (06 Jun 2018 04:57)  HR: 84 (06 Jun 2018 04:57) (68 - 84)  BP: 156/66 (06 Jun 2018 04:57) (134/62 - 175/82)  BP(mean): --  RR: 18 (06 Jun 2018 04:57) (16 - 18)  SpO2: 93% (06 Jun 2018 04:57) (93% - 98%)                          12.7   5.6   )-----------( 195      ( 05 Jun 2018 21:39 )             37.8       06-05    143  |  102  |  21  ----------------------------<  154<H>  4.0   |  28  |  1.39<H>        PT/INR - ( 05 Jun 2018 21:39 )   PT: 11.4 sec;   INR: 1.05 ratio         PTT - ( 05 Jun 2018 21:39 )  PTT:35.7 sec
Ortho Progress Note    S: Patient seen and examined. No acute events overnight. Pain well controlled with current regimen. Denies lightheadedness/dizziness, CP/SOB. Tolerating diet. Endo consulted for glycemic control      O:  Physical Exam:  Gen: Laying in bed, NAD, alert and oriented.   Resp: Unlabored breathing  Ext: EHL/FHL/TA/Sol intact          + SILT DP/SP/FRIAS/Sa          +DP, extremity WWP  dressing c/d/i    Vital Signs Last 24 Hrs  T(C): 37.1 (09 Jun 2018 04:36), Max: 37.2 (08 Jun 2018 12:42)  T(F): 98.7 (09 Jun 2018 04:36), Max: 99 (08 Jun 2018 12:42)  HR: 98 (09 Jun 2018 04:36) (85 - 98)  BP: 137/79 (09 Jun 2018 04:36) (126/70 - 137/79)  BP(mean): --  RR: 18 (09 Jun 2018 04:36) (17 - 18)  SpO2: 96% (09 Jun 2018 04:36) (95% - 97%)                          12.9   8.9   )-----------( 141      ( 07 Jun 2018 09:26 )             37.8       06-08    137  |  96  |  11  ----------------------------<  265<H>  4.4   |  28  |  0.92
Ortho Progress Note    S: Patient seen and examined. No acute events overnight. Pain well controlled with current regimen. Denies lightheadedness/dizziness, CP/SOB. Tolerating diet. Endo consulted for glycemic control      O:  Physical Exam:  Gen: Laying in bed, NAD, alert and oriented.   Resp: Unlabored breathing  Ext: EHL/FHL/TA/Sol intact          + SILT DP/SP/FRIAS/Sa          +DP, extremity WWP  dressing c/d/i    Vital Signs Last 24 Hrs  T(C): 37.1 (10 Bradford 2018 06:20), Max: 37.9 (09 Jun 2018 21:29)  T(F): 98.8 (10 Bradford 2018 06:20), Max: 100.2 (09 Jun 2018 21:29)  HR: 89 (10 Bradford 2018 06:20) (89 - 104)  BP: 159/80 (10 Bradford 2018 06:20) (121/- - 159/80)  BP(mean): --  RR: 18 (10 [place] 2018 06:20) (18 - 18)  SpO2: 96% (10 Bradford 2018 06:20) (96% - 96%)
Ortho Progress Note  Patient seen and examined. Resting comfortably. Incisions healed.    Physical Exam:  Gen: Laying in bed, NAD, alert, but confused, changing answers to questions  Resp: Unlabored breathing  Ext: EHL/FHL/TA/Sol intact          + SILT DP/SP/FRIAS/Sa          +DP, extremity WWP    Vital Signs Last 24 Hrs  Vital Signs Last 24 Hrs  T(C): 36.8 (07 Jun 2018 04:17), Max: 37.6 (06 Jun 2018 21:00)  T(F): 98.3 (07 Jun 2018 04:17), Max: 99.6 (06 Jun 2018 21:00)  HR: 84 (07 Jun 2018 04:17) (72 - 84)  BP: 138/77 (07 Jun 2018 04:17) (131/63 - 170/74)  BP(mean): 94 (06 Jun 2018 20:00) (90 - 111)  RR: 18 (07 Jun 2018 04:17) (14 - 18)  SpO2: 95% (07 Jun 2018 04:17) (93% - 99%)
Orthopaedic Surgery Progress Note    Subjective:   Patient seen and examined  No acute events overnight  Patient remains confused and agitated per nurse    Objective:  T(C): 36.7 (06-08-18 @ 04:10), Max: 36.8 (06-07-18 @ 10:37)  HR: 82 (06-08-18 @ 04:10) (79 - 88)  BP: 136/82 (06-08-18 @ 04:10) (131/72 - 154/85)  RR: 17 (06-08-18 @ 04:10) (17 - 18)  SpO2: 95% (06-08-18 @ 04:10) (95% - 97%)  Wt(kg): --    06-06 @ 07:01  -  06-07 @ 07:00  --------------------------------------------------------  IN: 1525 mL / OUT: 0 mL / NET: 1525 mL    06-07 @ 07:01  -  06-08 @ 06:50  --------------------------------------------------------  IN: 1495 mL / OUT: 1200 mL / NET: 295 mL        PE    NAD  RLE:   dressing C/D/I  motor intact GS/TA/EHL  SILT S/S/SP/DP  WWP                          12.9   8.9   )-----------( 141      ( 07 Jun 2018 09:26 )             37.8     06-07    138  |  98  |  16  ----------------------------<  246<H>  4.5   |  23  |  1.06    Ca    8.5      07 Jun 2018 09:21      PT/INR - ( 06 Jun 2018 09:50 )   PT: 13.1 sec;   INR: 1.20 ratio         PTT - ( 06 Jun 2018 09:50 )  PTT:34.9 sec        83M male s/p R CRPP for femoral neck fracture POD 2    Pain control  DVT ppx  WBAT  PT/OT  dispo planning
Patient is a 83y old  Male who presents with a chief complaint of Hip Fracture (06 Jun 2018 09:48)      HPI:  Patine is s/p   CRPP of hip .  Tolerated surgery well No co/o hip  pain no sob chest pain .  Baseline dementia.  Patient to be transferred to rehab     MEDICATIONS  (STANDING):  aspirin enteric coated 81 milliGRAM(s) Oral daily  dextrose 5%. 1000 milliLiter(s) (50 mL/Hr) IV Continuous <Continuous>  dextrose 50% Injectable 12.5 Gram(s) IV Push once  dextrose 50% Injectable 25 Gram(s) IV Push once  dextrose 50% Injectable 25 Gram(s) IV Push once  insulin glargine Injectable (LANTUS) 25 Unit(s) SubCutaneous at bedtime  insulin lispro (HumaLOG) corrective regimen sliding scale   SubCutaneous three times a day before meals  insulin lispro (HumaLOG) corrective regimen sliding scale   SubCutaneous at bedtime  insulin lispro Injectable (HumaLOG) 10 Unit(s) SubCutaneous three times a day before meals  mirtazapine 30 milliGRAM(s) Oral at bedtime  risperiDONE   Tablet 0.25 milliGRAM(s) Oral three times a day  senna 2 Tablet(s) Oral at bedtime    MEDICATIONS  (PRN):  acetaminophen   Tablet 650 milliGRAM(s) Oral every 6 hours PRN For Temp greater than 38 C (100.4 F)  acetaminophen   Tablet. 975 milliGRAM(s) Oral every 6 hours PRN Mild Pain (1 - 3)  dextrose 40% Gel 15 Gram(s) Oral once PRN Blood Glucose LESS THAN 70 milliGRAM(s)/deciliter  glucagon  Injectable 1 milliGRAM(s) IntraMuscular once PRN Glucose LESS THAN 70 milligrams/deciliter  ondansetron Injectable 4 milliGRAM(s) IV Push every 6 hours PRN Nausea and/or Vomiting  oxyCODONE    IR 5 milliGRAM(s) Oral every 4 hours PRN Severe Pain (7 - 10)  traMADol 50 milliGRAM(s) Oral every 6 hours PRN Moderate Pain (4 - 6)          VITALS:   T(C): 37.2 (06-11-18 @ 21:34), Max: 37.2 (06-11-18 @ 05:13)  HR: 98 (06-11-18 @ 21:34) (95 - 102)  BP: 118/71 (06-11-18 @ 21:34) (118/71 - 148/78)  RR: 18 (06-11-18 @ 21:34) (18 - 18)  SpO2: 94% (06-11-18 @ 21:34) (94% - 97%)  Wt(kg): --      PHYSICAL EXAM:  GENERAL: NAD, well nourished and conversant  HEAD:  Atraumatic  EYES: EOM, PERRLA, conjunctiva pink and sclera white  ENT: No tonsillar erythema, exudates, or enlargement, moist mucous membranes, good dentition, no lesions  NECK: Supple, No JVD, normal thyroid, carotids with normal upstrokes and no bruits  CHEST/LUNG: Clear to auscultation bilaterally, No rales, rhonchi, wheezing, or rubs  HEART: Regular rate and rhythm, No murmurs, rubs, or gallops  ABDOMEN: Soft, nondistended, no masses, guarding, tenderness or rebound, bowel sounds present  EXTREMITIES:  2+ Peripheral Pulses, No clubbing, cyanosis, or edema.   S?P HIp CFR  SKIN: No rashes or lesions  NERVOUS SYSTEM:  Alert & Oriented X3, normal cognitive function. Motor Strength 5/5 right upper and right lower.  5/5 left upper and left lower extremities, DTRs 2+ intact and symmetric    LABS:                      CAPILLARY BLOOD GLUCOSE      POCT Blood Glucose.: 234 mg/dL (11 Jun 2018 21:33)  POCT Blood Glucose.: 186 mg/dL (11 Jun 2018 20:16)  POCT Blood Glucose.: 123 mg/dL (11 Jun 2018 17:38)  POCT Blood Glucose.: 246 mg/dL (11 Jun 2018 12:14)  POCT Blood Glucose.: 222 mg/dL (11 Jun 2018 08:20)      RADIOLOGY & ADDITIONAL TESTS:
Patient is a 83y old  Male who presents with a chief complaint of Hip Fracture (06 Jun 2018 09:48)      HPI:  Patine is s/p   CRPP of hip .  Tolerated surgery well No co/o hip  pain no sob chest pain .  Baseline dementia.  Patient to be transferred to rehab when bed availaaaaavl    MEDICATIONS  (STANDING):  aspirin enteric coated 81 milliGRAM(s) Oral daily  dextrose 5%. 1000 milliLiter(s) (50 mL/Hr) IV Continuous <Continuous>  dextrose 50% Injectable 12.5 Gram(s) IV Push once  dextrose 50% Injectable 25 Gram(s) IV Push once  dextrose 50% Injectable 25 Gram(s) IV Push once  insulin glargine Injectable (LANTUS) 22 Unit(s) SubCutaneous at bedtime  insulin lispro (HumaLOG) corrective regimen sliding scale   SubCutaneous three times a day before meals  insulin lispro (HumaLOG) corrective regimen sliding scale   SubCutaneous at bedtime  insulin lispro Injectable (HumaLOG) 8 Unit(s) SubCutaneous three times a day before meals  mirtazapine 30 milliGRAM(s) Oral at bedtime  risperiDONE   Tablet 0.25 milliGRAM(s) Oral three times a day  senna 2 Tablet(s) Oral at bedtime    MEDICATIONS  (PRN):  acetaminophen   Tablet 650 milliGRAM(s) Oral every 6 hours PRN For Temp greater than 38 C (100.4 F)  acetaminophen   Tablet. 975 milliGRAM(s) Oral every 6 hours PRN Mild Pain (1 - 3)  dextrose 40% Gel 15 Gram(s) Oral once PRN Blood Glucose LESS THAN 70 milliGRAM(s)/deciliter  glucagon  Injectable 1 milliGRAM(s) IntraMuscular once PRN Glucose LESS THAN 70 milligrams/deciliter  ondansetron Injectable 4 milliGRAM(s) IV Push every 6 hours PRN Nausea and/or Vomiting  oxyCODONE    IR 5 milliGRAM(s) Oral every 4 hours PRN Severe Pain (7 - 10)  traMADol 50 milliGRAM(s) Oral every 6 hours PRN Moderate Pain (4 - 6)      Allergies    No Known Allergies    Intolerances        VITALS:   T(C): 37 (06-10-18 @ 12:10), Max: 37.9 (06-09-18 @ 21:29)  HR: 91 (06-10-18 @ 12:10) (89 - 97)  BP: 146/76 (06-10-18 @ 12:10) (131/62 - 159/80)  RR: 18 (06-10-18 @ 12:10) (18 - 18)  SpO2: 96% (06-10-18 @ 12:10) (96% - 96%)  Wt(kg): --    06-09 @ 07:01  -  06-10 @ 07:00  --------------------------------------------------------  IN: 240 mL / OUT: 200 mL / NET: 40 mL    06-10 @ 07:01  -  06-10 @ 15:09  --------------------------------------------------------  IN: 240 mL / OUT: 0 mL / NET: 240 mL        PHYSICAL EXAM:  GENERAL: NAD, well nourished and conversant  HEAD:  Atraumatic  EYES: EOM, PERRLA, conjunctiva pink and sclera white  ENT: No tonsillar erythema, exudates, or enlargement, moist mucous membranes, good dentition, no lesions  NECK: Supple, No JVD, normal thyroid, carotids with normal upstrokes and no bruits  CHEST/LUNG: Clear to auscultation bilaterally, No rales, rhonchi, wheezing, or rubs  HEART: Regular rate and rhythm, No murmurs, rubs, or gallops  ABDOMEN: Soft, nondistended, no masses, guarding, tenderness or rebound, bowel sounds present  EXTREMITIES:  2+ Peripheral Pulses, No clubbing, cyanosis, or edema.   S?P HIp CFR  SKIN: No rashes or lesions  NERVOUS SYSTEM:  Alert & Oriented X3, normal cognitive function. Motor Strength 5/5 right upper and right lower.  5/5 left upper and left lower extremities, DTRs 2+ intact and symmetric    LABS:      06-08    137  |  96  |  11  ----------------------------<  265<H>  4.4   |  28  |  0.92    Ca    8.8      08 Jun 2018 11:40      CAPILLARY BLOOD GLUCOSE      POCT Blood Glucose.: 310 mg/dL (10 Bradford 2018 11:50)  POCT Blood Glucose.: 250 mg/dL (10 Bradford 2018 08:19)  POCT Blood Glucose.: 235 mg/dL (09 Jun 2018 22:07)  POCT Blood Glucose.: 287 mg/dL (09 Jun 2018 16:40)      RADIOLOGY & ADDITIONAL TESTS:      Consultant(s):    Care Discussed with Consultants/Other Providers [ ] YES  [ ] NO
Patient is a 83y old  Male who presents with a chief complaint of Hip Fracture (06 Jun 2018 09:48)      HPI:  Resting comfortably NO sob or chest pain  CXR improved.  NO PNEUMONIA. Needs to do deep breathing exercises,  continue aggressive PT.  Mental status stable answers questions. Cooperates with DEEP breathing exercises  MEDICATIONS  (STANDING):  aspirin enteric coated 81 milliGRAM(s) Oral daily  dextrose 5%. 1000 milliLiter(s) (50 mL/Hr) IV Continuous <Continuous>  dextrose 50% Injectable 12.5 Gram(s) IV Push once  dextrose 50% Injectable 25 Gram(s) IV Push once  dextrose 50% Injectable 25 Gram(s) IV Push once  insulin detemir injectable (LEVEMIR) 34 Unit(s) SubCutaneous before breakfast  insulin lispro (HumaLOG) corrective regimen sliding scale   SubCutaneous three times a day before meals  insulin lispro (HumaLOG) corrective regimen sliding scale   SubCutaneous at bedtime  insulin lispro Injectable (HumaLOG) 6 Unit(s) SubCutaneous three times a day before meals  risperiDONE   Tablet 0.25 milliGRAM(s) Oral three times a day  senna 2 Tablet(s) Oral at bedtime    MEDICATIONS  (PRN):  acetaminophen   Tablet 650 milliGRAM(s) Oral every 6 hours PRN For Temp greater than 38 C (100.4 F)  dextrose 40% Gel 15 Gram(s) Oral once PRN Blood Glucose LESS THAN 70 milliGRAM(s)/deciliter  glucagon  Injectable 1 milliGRAM(s) IntraMuscular once PRN Glucose LESS THAN 70 milligrams/deciliter  ondansetron Injectable 4 milliGRAM(s) IV Push every 6 hours PRN Nausea and/or Vomiting  oxyCODONE    IR 10 milliGRAM(s) Oral every 4 hours PRN Moderate Pain  oxyCODONE    IR 5 milliGRAM(s) Oral every 4 hours PRN Mild Pain      Allergies    No Known Allergies    Intolerances        VITALS:   T(C): 37.2 (06-08-18 @ 12:42), Max: 37.2 (06-08-18 @ 12:42)  HR: 86 (06-08-18 @ 12:42) (79 - 86)  BP: 131/74 (06-08-18 @ 12:42) (131/74 - 140/80)  RR: 17 (06-08-18 @ 12:42) (17 - 17)  SpO2: 95% (06-08-18 @ 12:42) (95% - 97%)  Wt(kg): --    06-07 @ 07:01  -  06-08 @ 07:00  --------------------------------------------------------  IN: 1695 mL / OUT: 1200 mL / NET: 495 mL        PHYSICAL EXAM:  GENERAL: NAD, thin and conversant  HEAD:  Atraumatic  EYES: EOM, PERRLA, conjunctiva pink and sclera white  ENT: No tonsillar erythema, exudates, or enlargement, moist mucous membranes, good dentition, no lesions  NECK: Supple, No JVD, normal thyroid, carotids with normal upstrokes and no bruits  CHEST/LUNG: Clear to auscultation bilaterally, No rales, rhonchi, wheezing, or rubs PUSH patient to do deep breathing exercises  HEART: Regular rate and rhythm, No murmurs, rubs, or gallops  ABDOMEN: Soft, nondistended, no masses, guarding, tenderness or rebound, bowel sounds present  EXTREMITIES:  2+ Peripheral Pulses, No clubbing, cyanosis, or edema. No arthritis of shoulders, elbows, hands, hips, knees, ankles, or feet. No DJD C spine, T spine, or L/S spine  LYMPH: No lymphadenopathy noted  SKIN: No rashes or lesions  NERVOUS SYSTEM:  Alert & Oriented X3, normal cognitive function. Motor Strength 5/5 right upper and right lower.  5/5 left upper and left lower extremities, DTRs 2+ intact and symmetric    LABS:                          12.9   8.9   )-----------( 141      ( 07 Jun 2018 09:26 )             37.8     06-08    137  |  96  |  11  ----------------------------<  265<H>  4.4   |  28  |  0.92  06-07    138  |  98  |  16  ----------------------------<  246<H>  4.5   |  23  |  1.06  06-06    138  |  101  |  19  ----------------------------<  255<H>  4.7   |  25  |  1.17    Ca    8.8      08 Jun 2018 11:40  Ca    8.5      07 Jun 2018 09:21  Ca    9.1      06 Jun 2018 09:50    TPro  7.4  /  Alb  3.9  /  TBili  0.2  /  DBili  x   /  AST  20  /  ALT  17  /  AlkPhos  67  06-05    CAPILLARY BLOOD GLUCOSE      POCT Blood Glucose.: 274 mg/dL (08 Jun 2018 12:14)  POCT Blood Glucose.: 273 mg/dL (08 Jun 2018 10:59)  POCT Blood Glucose.: 308 mg/dL (07 Jun 2018 22:07)  POCT Blood Glucose.: 287 mg/dL (07 Jun 2018 17:01)      RADIOLOGY & ADDITIONAL TESTS:      Consultant(s):    Care Discussed with Consultants/Other Providers [ ] YES  [ ] NO
Patient is a 83y old  Male who presents with a chief complaint of Hip Fracture (06 Jun 2018 09:48)      HPI: Resting comfortably Pain controlled. NO sob or chest pain .  DM2 with persistent hy perglycemia. Need to increase Lantus with FS coverage.     MEDICATIONS  (STANDING):  aspirin enteric coated 81 milliGRAM(s) Oral daily  dextrose 5%. 1000 milliLiter(s) (50 mL/Hr) IV Continuous <Continuous>  dextrose 50% Injectable 12.5 Gram(s) IV Push once  dextrose 50% Injectable 25 Gram(s) IV Push once  dextrose 50% Injectable 25 Gram(s) IV Push once  insulin glargine Injectable (LANTUS) 18 Unit(s) SubCutaneous at bedtime  insulin lispro (HumaLOG) corrective regimen sliding scale   SubCutaneous three times a day before meals  insulin lispro (HumaLOG) corrective regimen sliding scale   SubCutaneous at bedtime  insulin lispro Injectable (HumaLOG) 6 Unit(s) SubCutaneous three times a day before meals  mirtazapine 30 milliGRAM(s) Oral at bedtime  risperiDONE   Tablet 0.25 milliGRAM(s) Oral three times a day  senna 2 Tablet(s) Oral at bedtime    MEDICATIONS  (PRN):  acetaminophen   Tablet 650 milliGRAM(s) Oral every 6 hours PRN For Temp greater than 38 C (100.4 F)  dextrose 40% Gel 15 Gram(s) Oral once PRN Blood Glucose LESS THAN 70 milliGRAM(s)/deciliter  glucagon  Injectable 1 milliGRAM(s) IntraMuscular once PRN Glucose LESS THAN 70 milligrams/deciliter  ondansetron Injectable 4 milliGRAM(s) IV Push every 6 hours PRN Nausea and/or Vomiting  oxyCODONE    IR 10 milliGRAM(s) Oral every 4 hours PRN Moderate Pain  oxyCODONE    IR 5 milliGRAM(s) Oral every 4 hours PRN Mild Pain      Allergies    No Known Allergies    Intolerances        VITALS:   T(C): 37 (06-09-18 @ 22:00), Max: 37.9 (06-09-18 @ 21:29)  HR: 97 (06-09-18 @ 21:29) (97 - 104)  BP: 131/62 (06-09-18 @ 21:29) (121/- - 137/79)  RR: 18 (06-09-18 @ 21:29) (18 - 18)  SpO2: 96% (06-09-18 @ 21:29) (96% - 96%)  Wt(kg): --    06-08 @ 07:01  -  06-09 @ 07:00  --------------------------------------------------------  IN: 690 mL / OUT: 300 mL / NET: 390 mL        PHYSICAL EXAM:  Lungs clear Heart regular no rubs galops murmurs  ABD benign  EXT no c/c/e s/p repair of hip fracture    LABS:      06-08    137  |  96  |  11  ----------------------------<  265<H>  4.4   |  28  |  0.92  06-07    138  |  98  |  16  ----------------------------<  246<H>  4.5   |  23  |  1.06    Ca    8.8      08 Jun 2018 11:40  Ca    8.5      07 Jun 2018 09:21      CAPILLARY BLOOD GLUCOSE      POCT Blood Glucose.: 235 mg/dL (09 Jun 2018 22:07)  POCT Blood Glucose.: 287 mg/dL (09 Jun 2018 16:40)  POCT Blood Glucose.: 315 mg/dL (09 Jun 2018 12:54)  POCT Blood Glucose.: 208 mg/dL (09 Jun 2018 11:38)  POCT Blood Glucose.: 250 mg/dL (09 Jun 2018 08:07)      RADIOLOGY & ADDITIONAL TESTS:      Consultant(s):    Care Discussed with Consultants/Other Providers [ ] YES  [ ] NO
Patient is a 83y old  Male who presents with a chief complaint of Hip Fracture (06 Jun 2018 09:48)      POST OPERATIVE DAY #:  5  Patient comfortable  No complaints    VS:  T(C): 37.2 (06-11-18 @ 05:13), Max: 37.2 (06-11-18 @ 00:33)  T(F): 98.9 (06-11-18 @ 05:13), Max: 99 (06-11-18 @ 00:33)  HR: 102 (06-11-18 @ 05:13) (85 - 102)  BP: 133/81 (06-11-18 @ 05:13) (127/72 - 159/80)  RR: 18 (06-11-18 @ 05:13) (16 - 18)  SpO2: 97% (06-11-18 @ 05:13) (95% - 97%)  Wt(kg): --      PHYSICAL EXAM:  NAD, Alert  EXT:   Rt Hip: Dressing C/D/I;   No Calf Tenderness  (+) DF/PF; (+) Distal Pulses;  Sensation: No gross deficits noted  Pulses 2+   B/L, PAS
Patient is a 83y old  Male who presents with a chief complaint of Hip Fracture (06 Jun 2018 09:48)      POST OPERATIVE DAY #:  6  Patient comfortable  No complaints. Pt ambulated well with PT yesterday. Denies CP/SOB    VS:  Vital Signs Last 24 Hrs  T(C): 37 (12 Jun 2018 00:46), Max: 37.2 (11 Jun 2018 21:34)  T(F): 98.6 (12 Jun 2018 00:46), Max: 98.9 (11 Jun 2018 21:34)  HR: 95 (12 Jun 2018 00:46) (95 - 100)  BP: 138/83 (12 Jun 2018 00:46) (118/71 - 148/78)  BP(mean): --  RR: 18 (12 Jun 2018 00:46) (18 - 18)  SpO2: 95% (12 Jun 2018 00:46) (94% - 96%)      PHYSICAL EXAM:  NAD, Alert  EXT:   Rt Hip: incision c/d/i.   No Calf Tenderness  (+) DF/PF; (+) Distal Pulses;  Sensation: No gross deficits noted  Pulses 2+   B/L, PAS
83 year old male, PMH Dementia, presented to the [facility] ED following a fall at home. Patient was moving around the recycling, when he tripped and fell to the ground. His son found him on the ground and called EMS. No CP/SOB. No head strike or LOC. Patient is a community ambulator with no assistive devices. However, son and daughter report that he likely needs assistive devices. Seen now resting comfortable awaiting OR      MEDICATIONS  (STANDING):  aspirin enteric coated 81 milliGRAM(s) Oral daily  ceFAZolin   IVPB 2000 milliGRAM(s) IV Intermittent every 8 hours  insulin lispro (HumaLOG) corrective regimen sliding scale   SubCutaneous three times a day before meals  insulin lispro (HumaLOG) corrective regimen sliding scale   SubCutaneous at bedtime  lactated ringers. 1000 milliLiter(s) (75 mL/Hr) IV Continuous <Continuous>  risperiDONE   Tablet 1 milliGRAM(s) Oral two times a day  senna 2 Tablet(s) Oral at bedtime    MEDICATIONS  (PRN):  acetaminophen   Tablet 650 milliGRAM(s) Oral every 6 hours PRN For Temp greater than 38 C (100.4 F)  ondansetron Injectable 4 milliGRAM(s) IV Push every 6 hours PRN Nausea and/or Vomiting  oxyCODONE    IR 10 milliGRAM(s) Oral every 4 hours PRN Moderate Pain  oxyCODONE    IR 5 milliGRAM(s) Oral every 4 hours PRN Mild Pain          VITALS:   T(C): 37.1 (06-06-18 @ 19:15), Max: 37.7 (06-06-18 @ 04:57)  HR: 81 (06-06-18 @ 20:00) (68 - 84)  BP: 135/66 (06-06-18 @ 20:00) (131/63 - 172/71)  RR: 16 (06-06-18 @ 20:00) (14 - 18)  SpO2: 99% (06-06-18 @ 20:00) (93% - 99%)  Wt(kg): --      physical exam  General: WN/WD NAD  Neurology: A&Ox3, nonfocal, NELSON x 4  Eyes: PERRLA/ EOMI, Gross vision intact  ENT/Neck: Neck supple, trachea midline, No JVD, Gross hearing intact  Respiratory: CTA B/L, No wheezing, rales, rhonchi  CV: RRR, S1S2, no murmurs, rubs or gallops  Abdominal: Soft, NT, ND +BS,   Extremities: RLE shortening and external rotation  Skin: No Rashes, Hematoma, Ecchymosis      LABS:        CBC Full  -  ( 06 Jun 2018 09:50 )  WBC Count : 8.2 K/uL  Hemoglobin : 12.8 g/dL  Hematocrit : 37.4 %  Platelet Count - Automated : 186 K/uL  Mean Cell Volume : 89.6 fl  Mean Cell Hemoglobin : 30.6 pg  Mean Cell Hemoglobin Concentration : 34.2 gm/dL  Auto Neutrophil # : x  Auto Lymphocyte # : x  Auto Monocyte # : x  Auto Eosinophil # : x  Auto Basophil # : x  Auto Neutrophil % : x  Auto Lymphocyte % : x  Auto Monocyte % : x  Auto Eosinophil % : x  Auto Basophil % : x    06-06    138  |  101  |  19  ----------------------------<  255<H>  4.7   |  25  |  1.17    Ca    9.1      06 Jun 2018 09:50    TPro  7.4  /  Alb  3.9  /  TBili  0.2  /  DBili  x   /  AST  20  /  ALT  17  /  AlkPhos  67  06-05    LIVER FUNCTIONS - ( 05 Jun 2018 21:39 )  Alb: 3.9 g/dL / Pro: 7.4 g/dL / ALK PHOS: 67 U/L / ALT: 17 U/L / AST: 20 U/L / GGT: x           PT/INR - ( 06 Jun 2018 09:50 )   PT: 13.1 sec;   INR: 1.20 ratio         PTT - ( 06 Jun 2018 09:50 )  PTT:34.9 sec    CAPILLARY BLOOD GLUCOSE      POCT Blood Glucose.: 200 mg/dL (06 Jun 2018 19:13)  POCT Blood Glucose.: 177 mg/dL (06 Jun 2018 17:40)  POCT Blood Glucose.: 231 mg/dL (06 Jun 2018 12:17)  POCT Blood Glucose.: 239 mg/dL (06 Jun 2018 07:49)  POCT Blood Glucose.: 190 mg/dL (06 Jun 2018 01:26)      RADIOLOGY & ADDITIONAL TESTS:

## 2018-06-12 NOTE — PROGRESS NOTE ADULT - ASSESSMENT
84 yo male present with a hx of a fall at home with a right IT fracture seen now s/p ORIF of hip Diabetes needs better control. Patient with post-op right leg weakness and having difficulty with post-op standing and walking.
82 yo male present with a hx of a fall at home with a right IT fracture seen now s/p ORIF of hip
82 yo male present with a hx of a fall at home with a right IT fracture seen now s/p ORIF of hip
82 yo male present with a hx of a fall at home with a right IT fracture seen now s/p ORIF of hip Diabetes needs better control
82 yo male present with a hx of a fall at home with a right IT fracture seen now s/p ORIF of hip Diabetes needs better control
83M sp CRPP    Neuro: Pain control  Resp: IS  GI: Regular diet, bowel reg  MSK: WBAT, PT/OT  Heme: DVT PPX  Dispo: rehap    Ortho [id number]
83M sp CRPP POD 4    Neuro: Pain control  Resp: IS  GI: Regular diet, bowel reg  MSK: WBAT, PT/OT  Heme: DVT PPX  Dispo: rehab    Ortho [id number]
83M w. R CRPP for femoral neck fracture    Pain control  DVT ppx  WBAT  PT/OT
83M w. R femoral neck fracture for OR today    Neuro: Pain control  Resp: IS  GI: NPO, IVF  MSK: NWB, bed rest, PT/OT  Heme: holding DVT PPx for OR today
84 y/o M w/ uncontrolled Type 2 DM w/ hyperglycemia s/p fall
84 y/o M w/ uncontrolled Type 2 DM w/ hyperglycemia s/p fall and R-hip fracture s/p closed reduction with percutaneous pinning.
84 y/o M w/h/o T2DM/dementia> here s/p fall found to have hip fx. Pt is not a candidate for surgery and will likely go to rehab as per primary team. Tolerating POs. Glycemic control remains above goal with BG >200s likely due to insulin underdosing/ good PO intake and limited mobility. No hypoglycemia. Will increase basal/bolus and reassess in 24 hours for further adjustments.
84 yo male present with a hx of a fall at home with a right IT fracture seen now s/p ORIF of hip Diabetes needs better control
S/P CRPP Rt Hip    Plan    Con't PT/WBAT  F/U endo recommendations  D/C planning.       Margaret Mora PA-C   Beeper    [id number]
S/P CRPP Rt Hip    Plan    Con't PT/WBAT  F/U endo recommendations  pain control  dvt prophylaxis  Rehab planning      Vitaliy Chand MD
84 yo male present with a hx of a fall at home with a right IT fracture

## 2018-06-12 NOTE — PROGRESS NOTE ADULT - PROBLEM SELECTOR PROBLEM 1
Closed fracture of right hip, initial encounter
Type 2 diabetes mellitus with hyperglycemia, with long-term current use of insulin
Closed fracture of right hip, initial encounter

## 2018-06-12 NOTE — PROGRESS NOTE ADULT - ATTENDING COMMENTS
Beginning to ambulate and doing well. No medical complications post-op to date and to proceed with physical therapy, as tolerated. Continues pulmonary toilet to lessen atelectasis, leg exercises as taught to lessen the risk of DVT and supervised pain medications for post-op pain control. I anticipate transfer to rehabilitation to follow when cleared by orthopedics.
DC planning to rehab

## 2018-06-12 NOTE — PROGRESS NOTE ADULT - PROBLEM SELECTOR PLAN 3
continue reorientation as needed

## 2019-03-29 ENCOUNTER — INBOUND DOCUMENT (OUTPATIENT)
Age: 84
End: 2019-03-29

## 2019-10-28 ENCOUNTER — RECORD ABSTRACTING (OUTPATIENT)
Age: 84
End: 2019-10-28

## 2019-10-28 DIAGNOSIS — H18.51 ENDOTHELIAL CORNEAL DYSTROPHY: ICD-10-CM

## 2019-10-28 DIAGNOSIS — K64.8 OTHER HEMORRHOIDS: ICD-10-CM

## 2019-10-28 DIAGNOSIS — S72.009A FRACTURE OF UNSPECIFIED PART OF NECK OF UNSPECIFIED FEMUR, INITIAL ENCOUNTER FOR CLOSED FRACTURE: ICD-10-CM

## 2019-10-28 DIAGNOSIS — H26.9 UNSPECIFIED CATARACT: ICD-10-CM

## 2019-10-28 DIAGNOSIS — Z87.891 PERSONAL HISTORY OF NICOTINE DEPENDENCE: ICD-10-CM

## 2019-10-28 DIAGNOSIS — H43.813 VITREOUS DEGENERATION, BILATERAL: ICD-10-CM

## 2019-10-28 DIAGNOSIS — Z78.9 OTHER SPECIFIED HEALTH STATUS: ICD-10-CM

## 2019-10-28 DIAGNOSIS — R31.9 HEMATURIA, UNSPECIFIED: ICD-10-CM

## 2019-10-28 DIAGNOSIS — R09.89 OTHER SPECIFIED SYMPTOMS AND SIGNS INVOLVING THE CIRCULATORY AND RESPIRATORY SYSTEMS: ICD-10-CM

## 2019-10-28 DIAGNOSIS — E16.2 HYPOGLYCEMIA, UNSPECIFIED: ICD-10-CM

## 2019-10-28 RX ORDER — LANCETS 33 GAUGE
EACH MISCELLANEOUS
Refills: 0 | Status: ACTIVE | COMMUNITY

## 2019-10-28 RX ORDER — FLASH GLUCOSE SCANNING READER
EACH MISCELLANEOUS
Refills: 0 | Status: ACTIVE | COMMUNITY

## 2019-10-28 RX ORDER — FLASH GLUCOSE SENSOR
KIT MISCELLANEOUS
Refills: 0 | Status: ACTIVE | COMMUNITY

## 2019-10-28 RX ORDER — POLYETHYLENE GLYCOL 3350, SODIUM SULFATE, SODIUM CHLORIDE, POTASSIUM CHLORIDE, ASCORBIC ACID, SODIUM ASCORBATE 7.5-2.691G
100 KIT ORAL
Refills: 0 | Status: ACTIVE | COMMUNITY

## 2019-10-28 RX ORDER — PEN NEEDLE, DIABETIC 32 GX 1/6"
32G X 4 MM NEEDLE, DISPOSABLE MISCELLANEOUS
Refills: 0 | Status: ACTIVE | COMMUNITY

## 2019-10-28 RX ORDER — ASPIRIN 81 MG
81 TABLET, DELAYED RELEASE (ENTERIC COATED) ORAL
Refills: 0 | Status: ACTIVE | COMMUNITY

## 2019-12-26 ENCOUNTER — RX RENEWAL (OUTPATIENT)
Age: 84
End: 2019-12-26

## 2019-12-27 ENCOUNTER — RX RENEWAL (OUTPATIENT)
Age: 84
End: 2019-12-27

## 2019-12-27 ENCOUNTER — MEDICATION RENEWAL (OUTPATIENT)
Age: 84
End: 2019-12-27

## 2020-01-02 ENCOUNTER — RX RENEWAL (OUTPATIENT)
Age: 85
End: 2020-01-02

## 2020-01-14 NOTE — ED PROVIDER NOTE - MEDICAL DECISION MAKING DETAILS
SEE SCANNED AUTHORIZATION   Pain sp fall ro hip fx check xry labs probable admit  Anup Hernandez MD, Facep

## 2020-02-12 PROBLEM — F03.90 UNSPECIFIED DEMENTIA WITHOUT BEHAVIORAL DISTURBANCE: Chronic | Status: ACTIVE | Noted: 2018-06-05

## 2020-02-12 PROBLEM — E11.9 TYPE 2 DIABETES MELLITUS WITHOUT COMPLICATIONS: Chronic | Status: ACTIVE | Noted: 2018-06-05

## 2020-02-20 ENCOUNTER — NON-APPOINTMENT (OUTPATIENT)
Age: 85
End: 2020-02-20

## 2020-02-20 ENCOUNTER — APPOINTMENT (OUTPATIENT)
Dept: INTERNAL MEDICINE | Facility: CLINIC | Age: 85
End: 2020-02-20
Payer: MEDICARE

## 2020-02-20 VITALS
OXYGEN SATURATION: 98 % | HEART RATE: 85 BPM | WEIGHT: 151 LBS | HEIGHT: 67 IN | DIASTOLIC BLOOD PRESSURE: 60 MMHG | SYSTOLIC BLOOD PRESSURE: 110 MMHG | TEMPERATURE: 99.2 F | BODY MASS INDEX: 23.7 KG/M2

## 2020-02-20 DIAGNOSIS — D64.9 ANEMIA, UNSPECIFIED: ICD-10-CM

## 2020-02-20 DIAGNOSIS — R09.89 OTHER SPECIFIED SYMPTOMS AND SIGNS INVOLVING THE CIRCULATORY AND RESPIRATORY SYSTEMS: ICD-10-CM

## 2020-02-20 PROCEDURE — 93000 ELECTROCARDIOGRAM COMPLETE: CPT

## 2020-02-20 PROCEDURE — 99214 OFFICE O/P EST MOD 30 MIN: CPT | Mod: 25

## 2020-02-20 PROCEDURE — 36415 COLL VENOUS BLD VENIPUNCTURE: CPT

## 2020-02-20 NOTE — ASSESSMENT
[FreeTextEntry1] : We are screening reportedly getting urine hemoglobin A1c and a cardiogram. He is to take the Risperdal for behavioral modification. He appears to be otherwise stable. EKG shows sinus rhythm and 1 JPCs or VPC and is otherwise within normal limits

## 2020-02-20 NOTE — HISTORY OF PRESENT ILLNESS
[de-identified] : The patient is an 84-year-old male with type 1 diabetes mellitus and dementia who comes in for followup. He is not taking his risperidone at this time his family is encouraged to use it at night prior to sleep. His sugars run approximately 160 on average without hypoglycemia and he is taking 21 units of NovoLog in the daytime and generally 6/7 short acting insulin 3 times a day depending on his sugars. His weight is stable and he has a good appetite. He denies visual problems and has no chest pain palpitations swelling fainting or dyspnea. He denies claudication weakness numbness loss of balance or coordination. His wife and son acted as his caretakers

## 2020-02-20 NOTE — PHYSICAL EXAM
[Normal] : no rash [de-identified] : Distal pulses decreased/ mild distal edema 1+ [de-identified] : Gait disturbance [de-identified] : dementia/00 reflexes at the ankle but proprioception and light touch are normal

## 2020-02-21 LAB
ALBUMIN SERPL ELPH-MCNC: 4.1 G/DL
ALP BLD-CCNC: 68 U/L
ALT SERPL-CCNC: 15 U/L
ANION GAP SERPL CALC-SCNC: 10 MMOL/L
APPEARANCE: CLEAR
AST SERPL-CCNC: 21 U/L
BACTERIA: NEGATIVE
BASOPHILS # BLD AUTO: 0.04 K/UL
BASOPHILS NFR BLD AUTO: 0.9 %
BILIRUB SERPL-MCNC: 0.3 MG/DL
BILIRUBIN URINE: NEGATIVE
BLOOD URINE: NEGATIVE
BUN SERPL-MCNC: 19 MG/DL
CALCIUM SERPL-MCNC: 9.6 MG/DL
CHLORIDE SERPL-SCNC: 106 MMOL/L
CHOLEST SERPL-MCNC: 129 MG/DL
CHOLEST/HDLC SERPL: 2.2 RATIO
CO2 SERPL-SCNC: 28 MMOL/L
COLOR: YELLOW
CREAT SERPL-MCNC: 1.06 MG/DL
EOSINOPHIL # BLD AUTO: 0.03 K/UL
EOSINOPHIL NFR BLD AUTO: 0.7 %
ESTIMATED AVERAGE GLUCOSE: 137 MG/DL
GLUCOSE QUALITATIVE U: NEGATIVE
GLUCOSE SERPL-MCNC: 108 MG/DL
HBA1C MFR BLD HPLC: 6.4 %
HCT VFR BLD CALC: 38.8 %
HDLC SERPL-MCNC: 59 MG/DL
HGB BLD-MCNC: 12.4 G/DL
HYALINE CASTS: 0 /LPF
IMM GRANULOCYTES NFR BLD AUTO: 0.7 %
KETONES URINE: NEGATIVE
LDLC SERPL CALC-MCNC: 56 MG/DL
LEUKOCYTE ESTERASE URINE: NEGATIVE
LYMPHOCYTES # BLD AUTO: 1.54 K/UL
LYMPHOCYTES NFR BLD AUTO: 34.6 %
MAN DIFF?: NORMAL
MCHC RBC-ENTMCNC: 28.8 PG
MCHC RBC-ENTMCNC: 32 GM/DL
MCV RBC AUTO: 90.2 FL
MICROSCOPIC-UA: NORMAL
MONOCYTES # BLD AUTO: 0.56 K/UL
MONOCYTES NFR BLD AUTO: 12.6 %
NEUTROPHILS # BLD AUTO: 2.25 K/UL
NEUTROPHILS NFR BLD AUTO: 50.5 %
NITRITE URINE: NEGATIVE
PH URINE: 5
PLATELET # BLD AUTO: 174 K/UL
POTASSIUM SERPL-SCNC: 4.4 MMOL/L
PROT SERPL-MCNC: 7.2 G/DL
PROTEIN URINE: NEGATIVE
RBC # BLD: 4.3 M/UL
RBC # FLD: 15 %
RED BLOOD CELLS URINE: 1 /HPF
SODIUM SERPL-SCNC: 144 MMOL/L
SPECIFIC GRAVITY URINE: 1.02
SQUAMOUS EPITHELIAL CELLS: 0 /HPF
TRIGL SERPL-MCNC: 68 MG/DL
UROBILINOGEN URINE: NORMAL
WBC # FLD AUTO: 4.45 K/UL
WHITE BLOOD CELLS URINE: 1 /HPF

## 2020-07-22 ENCOUNTER — APPOINTMENT (OUTPATIENT)
Dept: ENDOCRINOLOGY | Facility: CLINIC | Age: 85
End: 2020-07-22
Payer: MEDICARE

## 2020-07-22 VITALS
TEMPERATURE: 97.8 F | HEIGHT: 67 IN | WEIGHT: 148 LBS | HEART RATE: 74 BPM | OXYGEN SATURATION: 97 % | SYSTOLIC BLOOD PRESSURE: 122 MMHG | DIASTOLIC BLOOD PRESSURE: 80 MMHG | BODY MASS INDEX: 23.23 KG/M2

## 2020-07-22 PROCEDURE — 83036 HEMOGLOBIN GLYCOSYLATED A1C: CPT | Mod: QW

## 2020-07-22 PROCEDURE — 99204 OFFICE O/P NEW MOD 45 MIN: CPT | Mod: 25

## 2020-07-22 PROCEDURE — 36415 COLL VENOUS BLD VENIPUNCTURE: CPT

## 2020-07-31 LAB
25(OH)D3 SERPL-MCNC: 30.1 NG/ML
ALBUMIN SERPL ELPH-MCNC: 4.3 G/DL
ALP BLD-CCNC: 67 U/L
ALT SERPL-CCNC: 13 U/L
ANION GAP SERPL CALC-SCNC: 13 MMOL/L
AST SERPL-CCNC: 20 U/L
BASOPHILS # BLD AUTO: 0.02 K/UL
BASOPHILS NFR BLD AUTO: 0.4 %
BILIRUB SERPL-MCNC: 0.2 MG/DL
BUN SERPL-MCNC: 20 MG/DL
CALCIUM SERPL-MCNC: 9.5 MG/DL
CHLORIDE SERPL-SCNC: 106 MMOL/L
CHOLEST SERPL-MCNC: 162 MG/DL
CO2 SERPL-SCNC: 28 MMOL/L
CREAT SERPL-MCNC: 1.02 MG/DL
EOSINOPHIL # BLD AUTO: 0.03 K/UL
EOSINOPHIL NFR BLD AUTO: 0.6 %
ESTIMATED AVERAGE GLUCOSE: 114 MG/DL
FRUCTOSAMINE SERPL-MCNC: 283 UMOL/L
GLUCOSE SERPL-MCNC: 62 MG/DL
GLYCOMARK.: 14.3 UG/ML
HBA1C MFR BLD HPLC: 5.6 %
HBA1C MFR BLD HPLC: 6.1
HCT VFR BLD CALC: 37.2 %
HDLC SERPL-MCNC: 61 MG/DL
HGB BLD-MCNC: 11.6 G/DL
IMM GRANULOCYTES NFR BLD AUTO: 0.4 %
LDLC SERPL DIRECT ASSAY-MCNC: 92 MG/DL
LYMPHOCYTES # BLD AUTO: 2.16 K/UL
LYMPHOCYTES NFR BLD AUTO: 45.3 %
MAN DIFF?: NORMAL
MCHC RBC-ENTMCNC: 28.9 PG
MCHC RBC-ENTMCNC: 31.2 GM/DL
MCV RBC AUTO: 92.5 FL
MONOCYTES # BLD AUTO: 0.58 K/UL
MONOCYTES NFR BLD AUTO: 12.2 %
NEUTROPHILS # BLD AUTO: 1.96 K/UL
NEUTROPHILS NFR BLD AUTO: 41.1 %
PLATELET # BLD AUTO: 149 K/UL
POTASSIUM SERPL-SCNC: 4 MMOL/L
PROT SERPL-MCNC: 7.4 G/DL
RBC # BLD: 4.02 M/UL
RBC # FLD: 15.1 %
SODIUM SERPL-SCNC: 146 MMOL/L
T3FREE SERPL-MCNC: 2.13 PG/ML
T4 FREE SERPL-MCNC: 1 NG/DL
TRIGL SERPL-MCNC: 62 MG/DL
TSH SERPL-ACNC: 0.83 UIU/ML
WBC # FLD AUTO: 4.77 K/UL

## 2020-08-10 NOTE — HISTORY OF PRESENT ILLNESS
[FreeTextEntry1] : Mr. SERGEI CHAVES  is a 85 year  year-old  male  who presents for initial endocrine evaluation with regard to a hx of type 2 dm. The diabetes has been present for about  35  years. He denies any history of neuropathy or nephropathy. With regard to neuropathy,he [id number] any neurologic s/s.   . For the diabetes, He   is currently taking  Levemir 21 units in the  am and novolog scle pre meals (see chart)  . He too is on Metformin 500 mg daily and Atorvastatin 10 mg and asa 81 mg. He denies polyuria, polydipsia, or any visual changes. He  too denies any skin lesions, skin breakdown or non-healing areas of skin. He too denies any podiatric concerns. Ophthalmologic evaluation is up to date. Home glucose monitoring has shown values in am to be low 100's  and values rest of day similar but late afternoon to 50's at times.   . He does deny any hypoglycemia or hypoglycemic signs or symptoms.\par Additional medical history includes that of  hyperlipidemia\par PMD Dr. Carreno\par

## 2020-08-10 NOTE — PHYSICAL EXAM
[Alert] : alert [Well Nourished] : well nourished [Well Developed] : well developed [No Acute Distress] : no acute distress [EOMI] : extra ocular movement intact [Normal Sclera/Conjunctiva] : normal sclera/conjunctiva [No Proptosis] : no proptosis [Normal Oropharynx] : the oropharynx was normal [Thyroid Not Enlarged] : the thyroid was not enlarged [No Thyroid Nodules] : no palpable thyroid nodules [No Respiratory Distress] : no respiratory distress [No Accessory Muscle Use] : no accessory muscle use [Clear to Auscultation] : lungs were clear to auscultation bilaterally [Normal S1, S2] : normal S1 and S2 [Normal Rate] : heart rate was normal [Regular Rhythm] : with a regular rhythm [No Edema] : no peripheral edema [Pedal Pulses Normal] : the pedal pulses are present [Normal Bowel Sounds] : normal bowel sounds [Not Tender] : non-tender [Not Distended] : not distended [Soft] : abdomen soft [Normal Anterior Cervical Nodes] : no anterior cervical lymphadenopathy [Normal Posterior Cervical Nodes] : no posterior cervical lymphadenopathy [No Spinal Tenderness] : no spinal tenderness [No Stigmata of Cushings Syndrome] : no stigmata of Cushings Syndrome [Spine Straight] : spine straight [Normal Gait] : normal gait [Normal Strength/Tone] : muscle strength and tone were normal [No Rash] : no rash [Normal Reflexes] : deep tendon reflexes were 2+ and symmetric [No Tremors] : no tremors [Oriented x3] : oriented to person, place, and time [Acanthosis Nigricans] : no acanthosis nigricans

## 2020-08-20 LAB
ANION GAP SERPL CALC-SCNC: 10 MMOL/L
BASOPHILS # BLD AUTO: 0.03 K/UL
BASOPHILS NFR BLD AUTO: 0.6 %
BUN SERPL-MCNC: 21 MG/DL
CALCIUM SERPL-MCNC: 9.3 MG/DL
CHLORIDE SERPL-SCNC: 104 MMOL/L
CO2 SERPL-SCNC: 28 MMOL/L
CREAT SERPL-MCNC: 1.11 MG/DL
EOSINOPHIL # BLD AUTO: 0.04 K/UL
EOSINOPHIL NFR BLD AUTO: 0.8 %
GLUCOSE SERPL-MCNC: 144 MG/DL
HCT VFR BLD CALC: 38.4 %
HGB BLD-MCNC: 12.3 G/DL
IMM GRANULOCYTES NFR BLD AUTO: 0.6 %
LYMPHOCYTES # BLD AUTO: 1.97 K/UL
LYMPHOCYTES NFR BLD AUTO: 37.8 %
MAN DIFF?: NORMAL
MCHC RBC-ENTMCNC: 28.8 PG
MCHC RBC-ENTMCNC: 32 GM/DL
MCV RBC AUTO: 89.9 FL
MONOCYTES # BLD AUTO: 0.78 K/UL
MONOCYTES NFR BLD AUTO: 15 %
NEUTROPHILS # BLD AUTO: 2.36 K/UL
NEUTROPHILS NFR BLD AUTO: 45.2 %
PLATELET # BLD AUTO: 181 K/UL
POTASSIUM SERPL-SCNC: 4.5 MMOL/L
RBC # BLD: 4.27 M/UL
RBC # FLD: 14.7 %
SODIUM SERPL-SCNC: 143 MMOL/L
WBC # FLD AUTO: 5.21 K/UL

## 2020-11-24 ENCOUNTER — APPOINTMENT (OUTPATIENT)
Dept: ENDOCRINOLOGY | Facility: CLINIC | Age: 85
End: 2020-11-24

## 2020-12-31 NOTE — DISCHARGE NOTE ADULT - NS AS DC STROKE DX YN
Olinda Palm was seen and treated in our emergency department on 12/31/2020  Diagnosis: COVID rule out  Kamran Owens    He may return on this date:     If COVID test is negative, may return to work immediately  Of cover test is positive, may return to work 14 days after onset of symptoms  If you have any questions or concerns, please don't hesitate to call        Dre Watson, DO    ______________________________           _______________          _______________  Hospital Representative                              Date                                Time no

## 2021-01-01 ENCOUNTER — TRANSCRIPTION ENCOUNTER (OUTPATIENT)
Age: 86
End: 2021-01-01

## 2021-01-01 ENCOUNTER — RX CHANGE (OUTPATIENT)
Age: 86
End: 2021-01-01

## 2021-01-01 RX ORDER — RISPERIDONE 1 MG/1
1 TABLET, FILM COATED ORAL
Qty: 60 | Refills: 11 | Status: DISCONTINUED | COMMUNITY
Start: 2021-01-01 | End: 2021-01-01

## 2021-01-01 RX ORDER — RISPERIDONE 1 MG/1
1 TABLET, FILM COATED ORAL
Qty: 180 | Refills: 4 | Status: ACTIVE | COMMUNITY
Start: 2021-01-01 | End: 1900-01-01

## 2021-01-25 ENCOUNTER — APPOINTMENT (OUTPATIENT)
Dept: ENDOCRINOLOGY | Facility: CLINIC | Age: 86
End: 2021-01-25
Payer: MEDICARE

## 2021-01-25 VITALS
BODY MASS INDEX: 23.43 KG/M2 | HEART RATE: 84 BPM | WEIGHT: 149.25 LBS | OXYGEN SATURATION: 97 % | SYSTOLIC BLOOD PRESSURE: 124 MMHG | HEIGHT: 67 IN | TEMPERATURE: 97.9 F | DIASTOLIC BLOOD PRESSURE: 82 MMHG

## 2021-01-25 VITALS
SYSTOLIC BLOOD PRESSURE: 124 MMHG | TEMPERATURE: 97.9 F | HEIGHT: 67 IN | OXYGEN SATURATION: 97 % | BODY MASS INDEX: 23.39 KG/M2 | DIASTOLIC BLOOD PRESSURE: 82 MMHG | RESPIRATION RATE: 14 BRPM | WEIGHT: 149 LBS | HEART RATE: 84 BPM

## 2021-01-25 LAB
GLUCOSE BLDC GLUCOMTR-MCNC: 124
HBA1C MFR BLD HPLC: 6.3

## 2021-01-25 PROCEDURE — 99072 ADDL SUPL MATRL&STAF TM PHE: CPT

## 2021-01-25 PROCEDURE — 83036 HEMOGLOBIN GLYCOSYLATED A1C: CPT | Mod: QW

## 2021-01-25 PROCEDURE — 82962 GLUCOSE BLOOD TEST: CPT

## 2021-03-14 LAB
25(OH)D3 SERPL-MCNC: 38.3 NG/ML
ALBUMIN SERPL ELPH-MCNC: 4.3 G/DL
ALP BLD-CCNC: 73 U/L
ALT SERPL-CCNC: 16 U/L
ANION GAP SERPL CALC-SCNC: 16 MMOL/L
AST SERPL-CCNC: 25 U/L
BILIRUB SERPL-MCNC: 0.2 MG/DL
BUN SERPL-MCNC: 25 MG/DL
CALCIUM SERPL-MCNC: 9.9 MG/DL
CHLORIDE SERPL-SCNC: 102 MMOL/L
CHOLEST SERPL-MCNC: 172 MG/DL
CO2 SERPL-SCNC: 24 MMOL/L
CREAT SERPL-MCNC: 1.15 MG/DL
CREAT SPEC-SCNC: 153 MG/DL
ESTIMATED AVERAGE GLUCOSE: 143 MG/DL
FRUCTOSAMINE SERPL-MCNC: 311 UMOL/L
GLUCOSE SERPL-MCNC: 78 MG/DL
GLYCOMARK.: 15.1 UG/ML
HBA1C MFR BLD HPLC: 6.6 %
HDLC SERPL-MCNC: 60 MG/DL
LDLC SERPL DIRECT ASSAY-MCNC: 103 MG/DL
MICROALBUMIN 24H UR DL<=1MG/L-MCNC: <1.2 MG/DL
MICROALBUMIN/CREAT 24H UR-RTO: NORMAL MG/G
POTASSIUM SERPL-SCNC: 4.3 MMOL/L
PROT SERPL-MCNC: 8.3 G/DL
SODIUM SERPL-SCNC: 141 MMOL/L
T3FREE SERPL-MCNC: 2.27 PG/ML
T4 FREE SERPL-MCNC: 1.1 NG/DL
TRIGL SERPL-MCNC: 52 MG/DL
TSH SERPL-ACNC: 0.61 UIU/ML

## 2021-03-25 ENCOUNTER — NON-APPOINTMENT (OUTPATIENT)
Age: 86
End: 2021-03-25

## 2021-05-25 ENCOUNTER — APPOINTMENT (OUTPATIENT)
Dept: ENDOCRINOLOGY | Facility: CLINIC | Age: 86
End: 2021-05-25
Payer: MEDICARE

## 2021-05-25 VITALS
WEIGHT: 149 LBS | TEMPERATURE: 98.2 F | OXYGEN SATURATION: 97 % | SYSTOLIC BLOOD PRESSURE: 116 MMHG | DIASTOLIC BLOOD PRESSURE: 65 MMHG | HEIGHT: 67 IN | BODY MASS INDEX: 23.39 KG/M2 | HEART RATE: 65 BPM

## 2021-05-25 PROCEDURE — 36415 COLL VENOUS BLD VENIPUNCTURE: CPT

## 2021-05-25 PROCEDURE — 99214 OFFICE O/P EST MOD 30 MIN: CPT | Mod: 25

## 2021-05-25 PROCEDURE — 83036 HEMOGLOBIN GLYCOSYLATED A1C: CPT | Mod: QW

## 2021-05-25 PROCEDURE — 82962 GLUCOSE BLOOD TEST: CPT

## 2021-05-25 NOTE — HISTORY OF PRESENT ILLNESS
[FreeTextEntry1] : Mr. SERGEI CHAVES is a 86 year-old male who presents with regard to a hx of type 2 dm. The diabetes has been present for about 35 years. He denies any history of neuropathy or nephropathy. With regard to neuropathy, he denies any neurologic s/s. For the diabetes, He is currently taking Levemir 21 units in the am and novolog scale pre meals (see chart). He too is on Metformin 500 mg daily and Atorvastatin 10 mg and asa 81 mg. He denies polyuria, polydipsia, or any visual changes. He too denies any skin lesions, skin breakdown or non-healing areas of skin. He too denies any podiatric concerns. Ophthalmologic evaluation is not up to date. Home glucose monitoring has shown values running between 65-200s 2 hours postprandial and insulin administration, and testing pre-breakfast. He does deny any hypoglycemia or hypoglycemic signs or symptoms.\par Additional medical history includes that of hyperlipidemia. \par POCT A1c returned today at  %  \par POCT glucose returned today at 118  mg/dL\par PMD Dr. Carreno\par Pt having some trouble with hearing of late. \par Taking melatonin total of 30 mg [3 tabs of 10 mg] at bedtime, pt to reduce to 5 mg. \par Has not yet received COVID vaccine. \par

## 2021-07-01 ENCOUNTER — LABORATORY RESULT (OUTPATIENT)
Age: 86
End: 2021-07-01

## 2021-07-01 ENCOUNTER — NON-APPOINTMENT (OUTPATIENT)
Age: 86
End: 2021-07-01

## 2021-07-01 ENCOUNTER — APPOINTMENT (OUTPATIENT)
Dept: INTERNAL MEDICINE | Facility: CLINIC | Age: 86
End: 2021-07-01
Payer: MEDICARE

## 2021-07-01 VITALS
TEMPERATURE: 98 F | BODY MASS INDEX: 23.07 KG/M2 | WEIGHT: 147 LBS | SYSTOLIC BLOOD PRESSURE: 100 MMHG | DIASTOLIC BLOOD PRESSURE: 60 MMHG | HEART RATE: 78 BPM | HEIGHT: 67 IN | OXYGEN SATURATION: 93 %

## 2021-07-01 DIAGNOSIS — Z00.00 ENCOUNTER FOR GENERAL ADULT MEDICAL EXAMINATION W/OUT ABNORMAL FINDINGS: ICD-10-CM

## 2021-07-01 DIAGNOSIS — Z80.0 FAMILY HISTORY OF MALIGNANT NEOPLASM OF DIGESTIVE ORGANS: ICD-10-CM

## 2021-07-01 DIAGNOSIS — Z79.4 LONG TERM (CURRENT) USE OF INSULIN: ICD-10-CM

## 2021-07-01 DIAGNOSIS — N28.1 CYST OF KIDNEY, ACQUIRED: ICD-10-CM

## 2021-07-01 PROCEDURE — G0439: CPT

## 2021-07-01 PROCEDURE — 82270 OCCULT BLOOD FECES: CPT

## 2021-07-01 PROCEDURE — 93000 ELECTROCARDIOGRAM COMPLETE: CPT

## 2021-07-01 PROCEDURE — 36415 COLL VENOUS BLD VENIPUNCTURE: CPT

## 2021-07-01 RX ORDER — RISPERIDONE 1 MG/1
1 TABLET, FILM COATED ORAL
Qty: 60 | Refills: 11 | Status: DISCONTINUED | COMMUNITY
Start: 1900-01-01 | End: 2021-07-01

## 2021-07-01 NOTE — HISTORY OF PRESENT ILLNESS
[de-identified] : Patient is an 86-year-old male who comes in for physical examination.  He is treated for type 1 diabetes, hyperlipidemia, Alzheimer's dementia and more recently is having overall weakness especially of his legs and walking and is using a walker.  His wife and son act as his caregivers.  His sugar is now well controlled and he is seeing endocrinology and he runs approximately 145 on average.  He still gets occasional hypoglycemia when he misses a snack.  He has not had his eyes checked but denies visual problems.  He denies polydipsia polyuria polyphagia and has no blood in his urine or burning of urination.  He is able to walk approximately 1/8 of a mile without chest pain palpitations swelling fainting or dyspnea.  He has no GI symptoms and denies blood in the stool or black stool.  He denies numbness of his feet or ulceration.  He has had one Covid shot and is due for his second but has not had flu shots.  With the exception of his dementia he is doing reasonably well according to his son

## 2021-07-01 NOTE — PHYSICAL EXAM
[Normal Male:] : meatus normal, the bladder was normal on palpation, the scrotum was normal, there were no testicular masses and no prostate nodules. [Normal] : no joint swelling, grossly normal strength/tone [Comprehensive Foot Exam Normal] : Right and left foot were examined and both feet are normal. No ulcers in either foot. Toes are normal and with full ROM.  Normal tactile sensation with monofilament testing throughout both feet [de-identified] : Elderly man thinner walking with a walker with weakness of his legs/follows 1 and two-step commands [de-identified] : Arcus fundi were poorly seen probably secondary to cataracts [de-identified] : Poor dentition [de-identified] : 2+ 2+ carotids without bruits [de-identified] : 2 pulses are not felt but there are no proximal bruits in the legs and his feet are warm [de-identified] : Signs of insulin injections, no bruits, soft and no organomegaly [FreeTextEntry1] : Prostate is small and without nodules guaiac is negative there are no anal masses [de-identified] : No adenopathy [de-identified] : Thyroid is not palpable [de-identified] : No significant findings [de-identified] : Alzheimer's dementia/marked weakness of the legs with inability to get up on his toes but can do a small bend.  Reflexes are absent.  Sensory is decreased to proprioception subjectively okay to light touch [de-identified] : It is common follows 1 and two-step commands [de-identified] : No ulcerations or infections

## 2021-07-01 NOTE — ASSESSMENT
[FreeTextEntry1] : Patient is an 86-year-old male and his major medical problems at this time are persistent and continuing weakness of his legs probably secondary to diabetes.  He has Alzheimer's dementia, insulin-dependent diabetes followed by endocrine, is treated for hyperlipidemia.  He is encouraged to get a flu shot this year and is getting his second Covid vaccine and is up-to-date on Pneumovax.  We are checking blood chemistries and hemoglobin A1c as well as his lipids and CPK.  He is encouraged to walk more with his walker and to exercise his legs.  EKG is done as well and appears to be normal although it is electronically read as anteroseptal MI with small Q waves in V1 and V2 which is unchanged from previous EKGs

## 2021-07-02 LAB
25(OH)D3 SERPL-MCNC: 33 NG/ML
ALBUMIN SERPL ELPH-MCNC: 4.1 G/DL
ALP BLD-CCNC: 63 U/L
ALT SERPL-CCNC: 14 U/L
ANION GAP SERPL CALC-SCNC: 12 MMOL/L
AST SERPL-CCNC: 20 U/L
BASOPHILS # BLD AUTO: 0.02 K/UL
BASOPHILS NFR BLD AUTO: 0.5 %
BILIRUB SERPL-MCNC: 0.4 MG/DL
BUN SERPL-MCNC: 21 MG/DL
CALCIUM SERPL-MCNC: 9.5 MG/DL
CHLORIDE SERPL-SCNC: 105 MMOL/L
CHOLEST SERPL-MCNC: 154 MG/DL
CK SERPL-CCNC: 142 U/L
CO2 SERPL-SCNC: 25 MMOL/L
CREAT SERPL-MCNC: 1.1 MG/DL
CRP SERPL HS-MCNC: 0.65 MG/L
EOSINOPHIL # BLD AUTO: 0.02 K/UL
EOSINOPHIL NFR BLD AUTO: 0.5 %
ESTIMATED AVERAGE GLUCOSE: 126 MG/DL
FOLATE SERPL-MCNC: 15.2 NG/ML
GLUCOSE SERPL-MCNC: 170 MG/DL
HBA1C MFR BLD HPLC: 6 %
HCT VFR BLD CALC: 38.7 %
HDLC SERPL-MCNC: 60 MG/DL
HGB BLD-MCNC: 12.4 G/DL
IMM GRANULOCYTES NFR BLD AUTO: 0.2 %
LDLC SERPL CALC-MCNC: 82 MG/DL
LYMPHOCYTES # BLD AUTO: 1.8 K/UL
LYMPHOCYTES NFR BLD AUTO: 43.7 %
MAN DIFF?: NORMAL
MCHC RBC-ENTMCNC: 28.6 PG
MCHC RBC-ENTMCNC: 32 GM/DL
MCV RBC AUTO: 89.4 FL
MONOCYTES # BLD AUTO: 0.43 K/UL
MONOCYTES NFR BLD AUTO: 10.4 %
NEUTROPHILS # BLD AUTO: 1.84 K/UL
NEUTROPHILS NFR BLD AUTO: 44.7 %
NONHDLC SERPL-MCNC: 94 MG/DL
PLATELET # BLD AUTO: 162 K/UL
POTASSIUM SERPL-SCNC: 4.3 MMOL/L
PROT SERPL-MCNC: 7.7 G/DL
RBC # BLD: 4.33 M/UL
RBC # FLD: 14.8 %
SODIUM SERPL-SCNC: 142 MMOL/L
T4 SERPL-MCNC: 6.5 UG/DL
TRIGL SERPL-MCNC: 61 MG/DL
TSH SERPL-ACNC: 0.61 UIU/ML
VIT B12 SERPL-MCNC: 597 PG/ML
WBC # FLD AUTO: 4.12 K/UL

## 2021-08-01 LAB
25(OH)D3 SERPL-MCNC: 32.5 NG/ML
ALBUMIN SERPL ELPH-MCNC: 4 G/DL
ALP BLD-CCNC: 68 U/L
ALT SERPL-CCNC: 16 U/L
ANION GAP SERPL CALC-SCNC: 15 MMOL/L
AST SERPL-CCNC: 27 U/L
BILIRUB SERPL-MCNC: 0.2 MG/DL
BUN SERPL-MCNC: 21 MG/DL
CALCIUM SERPL-MCNC: 9.5 MG/DL
CHLORIDE SERPL-SCNC: 107 MMOL/L
CHOLEST SERPL-MCNC: 154 MG/DL
CO2 SERPL-SCNC: 22 MMOL/L
CREAT SERPL-MCNC: 1.07 MG/DL
ESTIMATED AVERAGE GLUCOSE: 131 MG/DL
FRUCTOSAMINE SERPL-MCNC: 293 UMOL/L
GLUCOSE BLDC GLUCOMTR-MCNC: 118
GLUCOSE SERPL-MCNC: 127 MG/DL
GLYCOMARK.: 14.4 UG/ML
HBA1C MFR BLD HPLC: 5.9
HBA1C MFR BLD HPLC: 6.2 %
HDLC SERPL-MCNC: 55 MG/DL
LDLC SERPL DIRECT ASSAY-MCNC: 87 MG/DL
POTASSIUM SERPL-SCNC: 4.4 MMOL/L
PROT SERPL-MCNC: 7.7 G/DL
SODIUM SERPL-SCNC: 144 MMOL/L
T3FREE SERPL-MCNC: 2 PG/ML
T4 FREE SERPL-MCNC: 1 NG/DL
TRIGL SERPL-MCNC: 74 MG/DL
TSH SERPL-ACNC: 0.82 UIU/ML

## 2021-09-30 NOTE — ED ADULT TRIAGE NOTE - CHIEF COMPLAINT QUOTE
hip injury s/p trip and fall [General Appearance - Well Developed] : well developed [General Appearance - Well Nourished] : well nourished [Normal Appearance] : normal appearance [Well Groomed] : well groomed [General Appearance - In No Acute Distress] : no acute distress [Abdomen Soft] : soft [Abdomen Tenderness] : non-tender [Costovertebral Angle Tenderness] : no ~M costovertebral angle tenderness [Edema] : no peripheral edema [] : no respiratory distress [Respiration, Rhythm And Depth] : normal respiratory rhythm and effort [Exaggerated Use Of Accessory Muscles For Inspiration] : no accessory muscle use [Normal Station and Gait] : the gait and station were normal for the patient's age [FreeTextEntry1] : no evidence ONOFRE. Pad completely dry.

## 2022-01-01 ENCOUNTER — RX RENEWAL (OUTPATIENT)
Age: 87
End: 2022-01-01

## 2022-01-01 ENCOUNTER — APPOINTMENT (OUTPATIENT)
Dept: VASCULAR SURGERY | Facility: CLINIC | Age: 87
End: 2022-01-01
Payer: MEDICARE

## 2022-01-01 ENCOUNTER — RX CHANGE (OUTPATIENT)
Age: 87
End: 2022-01-01

## 2022-01-01 ENCOUNTER — APPOINTMENT (OUTPATIENT)
Dept: INTERNAL MEDICINE | Facility: CLINIC | Age: 87
End: 2022-01-01
Payer: MEDICARE

## 2022-01-01 ENCOUNTER — NON-APPOINTMENT (OUTPATIENT)
Age: 87
End: 2022-01-01

## 2022-01-01 ENCOUNTER — APPOINTMENT (OUTPATIENT)
Dept: ENDOCRINOLOGY | Facility: CLINIC | Age: 87
End: 2022-01-01

## 2022-01-01 VITALS
SYSTOLIC BLOOD PRESSURE: 110 MMHG | DIASTOLIC BLOOD PRESSURE: 70 MMHG | HEIGHT: 67 IN | TEMPERATURE: 98.2 F | WEIGHT: 137 LBS | HEART RATE: 98 BPM | BODY MASS INDEX: 21.5 KG/M2 | OXYGEN SATURATION: 96 %

## 2022-01-01 VITALS
WEIGHT: 134 LBS | HEIGHT: 67 IN | OXYGEN SATURATION: 97 % | BODY MASS INDEX: 21.03 KG/M2 | TEMPERATURE: 98.1 F | HEART RATE: 98 BPM

## 2022-01-01 VITALS
WEIGHT: 147 LBS | SYSTOLIC BLOOD PRESSURE: 118 MMHG | DIASTOLIC BLOOD PRESSURE: 74 MMHG | HEART RATE: 117 BPM | BODY MASS INDEX: 23.07 KG/M2 | HEIGHT: 67 IN

## 2022-01-01 VITALS — SYSTOLIC BLOOD PRESSURE: 120 MMHG | DIASTOLIC BLOOD PRESSURE: 80 MMHG

## 2022-01-01 DIAGNOSIS — R26.9 UNSPECIFIED ABNORMALITIES OF GAIT AND MOBILITY: ICD-10-CM

## 2022-01-01 DIAGNOSIS — F03.90 UNSPECIFIED DEMENTIA W/OUT BEHAVIORAL DISTURBANCE: ICD-10-CM

## 2022-01-01 DIAGNOSIS — E55.9 VITAMIN D DEFICIENCY, UNSPECIFIED: ICD-10-CM

## 2022-01-01 DIAGNOSIS — E10.65 TYPE 1 DIABETES MELLITUS WITH HYPERGLYCEMIA: ICD-10-CM

## 2022-01-01 DIAGNOSIS — Z86.69 PERSONAL HISTORY OF OTHER DISEASES OF THE NERVOUS SYSTEM AND SENSE ORGANS: ICD-10-CM

## 2022-01-01 DIAGNOSIS — R41.3 OTHER AMNESIA: ICD-10-CM

## 2022-01-01 DIAGNOSIS — E11.9 TYPE 2 DIABETES MELLITUS W/OUT COMPLICATIONS: ICD-10-CM

## 2022-01-01 DIAGNOSIS — E78.5 HYPERLIPIDEMIA, UNSPECIFIED: ICD-10-CM

## 2022-01-01 LAB
ALBUMIN SERPL ELPH-MCNC: 4 G/DL
ALBUMIN SERPL ELPH-MCNC: 4.2 G/DL
ALP BLD-CCNC: 69 U/L
ALP BLD-CCNC: 74 U/L
ALT SERPL-CCNC: 13 U/L
ALT SERPL-CCNC: 17 U/L
ANION GAP SERPL CALC-SCNC: 11 MMOL/L
ANION GAP SERPL CALC-SCNC: 12 MMOL/L
AST SERPL-CCNC: 24 U/L
AST SERPL-CCNC: 26 U/L
BASOPHILS # BLD AUTO: 0.03 K/UL
BASOPHILS # BLD AUTO: 0.03 K/UL
BASOPHILS NFR BLD AUTO: 0.6 %
BASOPHILS NFR BLD AUTO: 0.9 %
BILIRUB SERPL-MCNC: 0.3 MG/DL
BILIRUB SERPL-MCNC: 0.3 MG/DL
BUN SERPL-MCNC: 20 MG/DL
BUN SERPL-MCNC: 20 MG/DL
CALCIUM SERPL-MCNC: 9.2 MG/DL
CALCIUM SERPL-MCNC: 9.5 MG/DL
CHLORIDE SERPL-SCNC: 103 MMOL/L
CHLORIDE SERPL-SCNC: 104 MMOL/L
CHOLEST SERPL-MCNC: 136 MG/DL
CHOLEST SERPL-MCNC: 145 MG/DL
CK SERPL-CCNC: 137 U/L
CK SERPL-CCNC: 144 U/L
CO2 SERPL-SCNC: 26 MMOL/L
CO2 SERPL-SCNC: 28 MMOL/L
CREAT SERPL-MCNC: 1.08 MG/DL
CREAT SERPL-MCNC: 1.1 MG/DL
CRP SERPL HS-MCNC: 1.16 MG/L
EGFR: 65 ML/MIN/1.73M2
EGFR: 67 ML/MIN/1.73M2
EOSINOPHIL # BLD AUTO: 0.02 K/UL
EOSINOPHIL # BLD AUTO: 0.02 K/UL
EOSINOPHIL NFR BLD AUTO: 0.4 %
EOSINOPHIL NFR BLD AUTO: 0.6 %
ESTIMATED AVERAGE GLUCOSE: 117 MG/DL
ESTIMATED AVERAGE GLUCOSE: 123 MG/DL
GLUCOSE SERPL-MCNC: 107 MG/DL
GLUCOSE SERPL-MCNC: 112 MG/DL
HBA1C MFR BLD HPLC: 5.7 %
HBA1C MFR BLD HPLC: 5.9 %
HCT VFR BLD CALC: 39.2 %
HCT VFR BLD CALC: 40.6 %
HDLC SERPL-MCNC: 59 MG/DL
HDLC SERPL-MCNC: 59 MG/DL
HGB BLD-MCNC: 12.1 G/DL
HGB BLD-MCNC: 12.9 G/DL
IMM GRANULOCYTES NFR BLD AUTO: 0.6 %
IMM GRANULOCYTES NFR BLD AUTO: 0.8 %
LDLC SERPL CALC-MCNC: 64 MG/DL
LDLC SERPL CALC-MCNC: 76 MG/DL
LYMPHOCYTES # BLD AUTO: 1.13 K/UL
LYMPHOCYTES # BLD AUTO: 1.64 K/UL
LYMPHOCYTES NFR BLD AUTO: 33.1 %
LYMPHOCYTES NFR BLD AUTO: 34.5 %
MAN DIFF?: NORMAL
MAN DIFF?: NORMAL
MCHC RBC-ENTMCNC: 27.8 PG
MCHC RBC-ENTMCNC: 28.9 PG
MCHC RBC-ENTMCNC: 30.9 GM/DL
MCHC RBC-ENTMCNC: 31.8 GM/DL
MCV RBC AUTO: 90.1 FL
MCV RBC AUTO: 90.8 FL
MONOCYTES # BLD AUTO: 0.43 K/UL
MONOCYTES # BLD AUTO: 0.6 K/UL
MONOCYTES NFR BLD AUTO: 12.1 %
MONOCYTES NFR BLD AUTO: 13.1 %
NEUTROPHILS # BLD AUTO: 1.65 K/UL
NEUTROPHILS # BLD AUTO: 2.63 K/UL
NEUTROPHILS NFR BLD AUTO: 50.3 %
NEUTROPHILS NFR BLD AUTO: 53 %
NONHDLC SERPL-MCNC: 78 MG/DL
NONHDLC SERPL-MCNC: 86 MG/DL
PLATELET # BLD AUTO: 166 K/UL
PLATELET # BLD AUTO: 181 K/UL
POTASSIUM SERPL-SCNC: 4.6 MMOL/L
POTASSIUM SERPL-SCNC: 4.8 MMOL/L
PROT SERPL-MCNC: 7.7 G/DL
PROT SERPL-MCNC: 7.7 G/DL
RBC # BLD: 4.35 M/UL
RBC # BLD: 4.47 M/UL
RBC # FLD: 14.8 %
RBC # FLD: 15.4 %
SODIUM SERPL-SCNC: 141 MMOL/L
SODIUM SERPL-SCNC: 143 MMOL/L
T3 SERPL-MCNC: 78 NG/DL
T4 SERPL-MCNC: 6.7 UG/DL
TRIGL SERPL-MCNC: 52 MG/DL
TRIGL SERPL-MCNC: 67 MG/DL
TSH SERPL-ACNC: 0.74 UIU/ML
TSH SERPL-ACNC: 0.78 UIU/ML
WBC # FLD AUTO: 3.28 K/UL
WBC # FLD AUTO: 4.96 K/UL

## 2022-01-01 PROCEDURE — 93000 ELECTROCARDIOGRAM COMPLETE: CPT

## 2022-01-01 PROCEDURE — 99214 OFFICE O/P EST MOD 30 MIN: CPT | Mod: 25

## 2022-01-01 PROCEDURE — 93923 UPR/LXTR ART STDY 3+ LVLS: CPT

## 2022-01-01 PROCEDURE — 36415 COLL VENOUS BLD VENIPUNCTURE: CPT

## 2022-01-01 PROCEDURE — 99203 OFFICE O/P NEW LOW 30 MIN: CPT

## 2022-01-01 RX ORDER — INSULIN ASPART 100 [IU]/ML
100 INJECTION, SOLUTION INTRAVENOUS; SUBCUTANEOUS
Qty: 45 | Refills: 3 | Status: DISCONTINUED | COMMUNITY
Start: 2022-01-01 | End: 2022-01-01

## 2022-01-01 RX ORDER — ATORVASTATIN CALCIUM 10 MG/1
10 TABLET, FILM COATED ORAL
Qty: 90 | Refills: 1 | Status: ACTIVE | COMMUNITY
Start: 2020-08-17 | End: 1900-01-01

## 2022-01-01 RX ORDER — INSULIN DETEMIR 100 [IU]/ML
100 INJECTION, SOLUTION SUBCUTANEOUS
Qty: 3 | Refills: 3 | Status: ACTIVE | COMMUNITY
Start: 2022-01-01 | End: 1900-01-01

## 2022-01-01 RX ORDER — METFORMIN HYDROCHLORIDE 500 MG/1
500 TABLET, COATED ORAL
Qty: 90 | Refills: 3 | Status: ACTIVE | COMMUNITY

## 2022-01-01 RX ORDER — INSULIN ASPART 100 [IU]/ML
100 INJECTION, SOLUTION INTRAVENOUS; SUBCUTANEOUS
Qty: 2 | Refills: 1 | Status: ACTIVE | COMMUNITY
Start: 2019-12-27 | End: 1900-01-01

## 2022-01-01 RX ORDER — INSULIN ASPART 100 [IU]/ML
100 INJECTION, SOLUTION INTRAVENOUS; SUBCUTANEOUS
Qty: 135 | Refills: 3 | Status: DISCONTINUED | COMMUNITY
Start: 2022-01-01 | End: 2022-01-01

## 2022-01-01 RX ORDER — INSULIN DETEMIR 100 [IU]/ML
100 INJECTION, SOLUTION SUBCUTANEOUS
Qty: 45 | Refills: 3 | Status: DISCONTINUED | COMMUNITY
Start: 2022-01-01 | End: 2022-01-01

## 2022-01-01 RX ORDER — BLOOD SUGAR DIAGNOSTIC
STRIP MISCELLANEOUS
Qty: 40000 | Refills: 3 | Status: ACTIVE | COMMUNITY
Start: 2022-01-01 | End: 1900-01-01

## 2022-01-01 RX ORDER — PEN NEEDLE, DIABETIC 29 G X1/2"
31G X 8 MM NEEDLE, DISPOSABLE MISCELLANEOUS
Qty: 2 | Refills: 3 | Status: ACTIVE | COMMUNITY
Start: 1900-01-01 | End: 1900-01-01

## 2022-01-01 RX ORDER — INSULIN DETEMIR 100 [IU]/ML
100 INJECTION, SOLUTION SUBCUTANEOUS
Qty: 675 | Refills: 3 | Status: DISCONTINUED | COMMUNITY
Start: 2022-01-01 | End: 2022-01-01

## 2022-01-01 RX ORDER — INSULIN ASPART 100 [IU]/ML
100 INJECTION, SOLUTION INTRAVENOUS; SUBCUTANEOUS
Qty: 3 | Refills: 3 | Status: ACTIVE | COMMUNITY
Start: 2022-01-01 | End: 1900-01-01

## 2022-03-11 NOTE — PATIENT PROFILE ADULT. - NS PRO CONTRA FLU 1
out of season (available sept 1 thru apr 2 only) Tranexamic Acid Pregnancy And Lactation Text: It is unknown if this medication is safe during pregnancy or breast feeding.

## 2022-03-22 NOTE — PHYSICAL THERAPY INITIAL EVALUATION ADULT - ACTIVE RANGE OF MOTION EXAMINATION, REHAB EVAL
bilateral upper extremity Active ROM was WFL (within functional limits)/bilateral  lower extremity Active ROM was WFL (within functional limits) Attending with

## 2022-03-29 NOTE — PHYSICAL EXAM
[2+] : right 2+ [0] : left 0 [No Rash or Lesion] : No rash or lesion [Alert] : alert [Calm] : calm [JVD] : no jugular venous distention  [Normal Breath Sounds] : Normal breath sounds [Ankle Swelling (On Exam)] : not present [Varicose Veins Of Lower Extremities] : not present [] : not present [Skin Ulcer] : no ulcer [de-identified] : appears well  [de-identified] : no calf tenderness, no palpable cord

## 2022-03-29 NOTE — ASSESSMENT
[FreeTextEntry1] : 86 yo male with a history of dm presents for evaluation of left lower extremity darkened skin discoloration.\par \par melyssa/pvr shows no evidence of significant arterial disease \par \par at this time no vascular surgical intervention \par pt to follow up as needed

## 2022-03-29 NOTE — HISTORY OF PRESENT ILLNESS
[FreeTextEntry1] : 88 yo male with a history of dm presents for evaluation of left lower extremity darkened skin discoloration.  pt denies any pain or open wounds.  pt's walking has become unsteady and difficult but walker helps \par

## 2022-04-06 NOTE — HISTORY OF PRESENT ILLNESS
[de-identified] : Patient is an 87-year-old male with dementia and type 1 diabetes mellitus who comes in for follow-up and short physical.  He was recently seen by vascular and found to have only mildly decreased pulses peripherally.  He denies claudication.  His blood sugars are generally running in the 100-1 30 range.  He denies polydipsia polyuria polyphagia.  He needs to have an eye exam.  Is walking with a walker because of his balance and does not go very far but has no chest pain palpitations swelling fainting or dyspnea.  He has no GI or  symptoms.  His behavior problems are less on the current medication.  He has had his Covid shots

## 2022-04-06 NOTE — PHYSICAL EXAM
[Normal] : no rash [Comprehensive Foot Exam Normal] : Right and left foot were examined and both feet are normal. No ulcers in either foot. Toes are normal and with full ROM.  Normal tactile sensation with monofilament testing throughout both feet [de-identified] : Still pulses not well appreciated but review of Dopplers shows only minor decrease [de-identified] : Airway insulin is given is pigmented [de-identified] : Dementia but calm and does answer simple questions [de-identified] : Proprioception and normal light touch

## 2022-04-06 NOTE — REVIEW OF SYSTEMS
[Negative] : Heme/Lymph [FreeTextEntry9] : Walks with a walker because of poor balance [de-identified] : Mention but appears to be doing better

## 2022-04-06 NOTE — ASSESSMENT
[FreeTextEntry1] : The patient's dementia is stable and he is not requiring an aide at home and is helped by his wife and daughter who comes in to see him.  His diabetes appears to be well controlled we are getting a glucose and hemoglobin A1c.  There are no further suggestions for his gait abnormality and he should be more active with his walker.  We are checking his lipids liver functions and CPK as well as a CRP.  As discussed his vascular visit was reviewed as well the Dopplers

## 2022-06-14 PROBLEM — E11.9 DIABETES MELLITUS, TYPE 2: Status: ACTIVE | Noted: 2019-10-28

## 2022-06-14 PROBLEM — E55.9 VITAMIN D DEFICIENCY: Status: ACTIVE | Noted: 2019-10-28

## 2022-06-14 NOTE — HISTORY OF PRESENT ILLNESS
[FreeTextEntry1] : Mr. SERGEI CHAVES is a 86 year-old male who presents with regard to a hx of type 2 dm. The diabetes has been present for about 35 years. He denies any history of neuropathy or nephropathy. With regard to neuropathy, he denies any neurologic s/s. For the diabetes, He is currently taking Levemir 21 units in the am and novolog scale pre meals (see chart). He too is on Metformin 500 mg daily and Atorvastatin 10 mg and asa 81 mg. He denies polyuria, polydipsia, or any visual changes. He too denies any skin lesions, skin breakdown or non-healing areas of skin. He too denies any podiatric concerns. Ophthalmologic evaluation is not up to date. Home glucose monitoring has shown values running               He does deny any hypoglycemia or hypoglycemic signs or symptoms.\par \par POCT A1c returned today at % ; previously 5.9% on 4/6/22 \par POCT glucose returned today at   mg/dL\par Additional medical history includes that of hyperlipidemia. \par PMD Dr. Carreno\par Pt having some trouble with hearing of late. \par Taking melatonin total of 30 mg [3 tabs of 10 mg] at bedtime, pt to reduce to 5 mg. \par Has not yet received COVID vaccine.

## 2022-06-24 PROBLEM — E10.65 DIABETES TYPE 1, UNCONTROLLED: Status: ACTIVE | Noted: 2019-10-28

## 2022-06-24 PROBLEM — R41.3 MEMORY LOSS OR IMPAIRMENT: Status: ACTIVE | Noted: 2019-10-28

## 2022-06-24 PROBLEM — F03.90 DEMENTIA: Status: ACTIVE | Noted: 2019-10-28

## 2022-06-24 PROBLEM — R26.9 GAIT ABNORMALITY: Status: ACTIVE | Noted: 2019-10-28

## 2022-06-24 PROBLEM — Z86.69 HISTORY OF GLAUCOMA: Status: RESOLVED | Noted: 2022-01-01 | Resolved: 2022-01-01

## 2022-06-24 PROBLEM — E78.5 HYPERLIPIDEMIA: Status: ACTIVE | Noted: 2019-10-28

## 2022-06-24 NOTE — HISTORY OF PRESENT ILLNESS
[de-identified] : The patient is an 87-year-old male who comes in for follow-up.  He has dementia type 1 diabetes which is followed and seen by endocrinology and we will have reviewed his June 14 visit to endocrine, hyperlipidemia and weakness of his legs possibly secondary to the diabetes and walking with a rolling walker.  He is to spend the next number of months and is currently at home of [facility].  At the present time he has no specific complaints and his sugar has been under control with a sliding scale that he uses.  He has not had episodes of hypoglycemia.  He denies chest pain palpitations swelling fainting or dyspnea.  He has no GI symptoms and no change in his stool or blood in the stool or melena.  He denies polydipsia polyuria or polyphasia.  He denies claudication sores on his legs or numbness or tingling.  He has been stable at home according to his daughter

## 2022-06-24 NOTE — REVIEW OF SYSTEMS
[Negative] : Heme/Lymph [FreeTextEntry3] : Taking drops for new finding of glaucoma [de-identified] : Dementia and gait abnormality secondary to weakness of the lower extremities and walking with a rolling walker

## 2022-06-24 NOTE — ASSESSMENT
[FreeTextEntry1] : The patient's family is planning for him to be in [facility] for the next number of months.  His diabetes has been under control according to the family and we are checking his glucose and hemoglobin A1c.  He does have a mild tachycardia and no cardiac complaints but we are getting an EKG. EKG shows a sinus tachycardia of 104 with a first-degree block and a question of an old anterior septal MI which is unchanged with the exception of rate increase.  We are checking his thyroid functions.  we are getting screening blood tests and a urine.  The etiology of his weakness at this time is unclear and we may require neurologic testing going forward

## 2022-06-24 NOTE — PHYSICAL EXAM
[Normal] : no posterior cervical lymphadenopathy and no anterior cervical lymphadenopathy [Comprehensive Foot Exam Normal] : Right and left foot were examined and both feet are normal. No ulcers in either foot. Toes are normal and with full ROM.  Normal tactile sensation with monofilament testing throughout both feet [de-identified] : The patient is in no acute distress and is breathing normally [de-identified] : 2+ 2+ carotids without bruits [de-identified] : Mild tachycardia [de-identified] : Peripheral pulses in the feet not well felt but his feet are warm and there are no proximal bruits [de-identified] : The patient has normal light touch but his proprioception is off to some degree.  He has significant weakness in both his ankles and proximal muscles of the legs

## 2023-02-27 NOTE — ED ADULT NURSE NOTE - DOES PATIENT HAVE ADVANCE DIRECTIVE
Patient called and left a message to cancel follow up appointment today. He is moving to [place].    He states he wanted to let Dr. Sapp know that he is very thankful for you being his doctor.  He wanted to let you know he is doing really well and has lost down to 180 lbs and feels great.    
Terence lerner  
No

## 2024-01-10 NOTE — PHYSICAL THERAPY INITIAL EVALUATION ADULT - PERTINENT HX OF CURRENT PROBLEM, REHAB EVAL
83 year old male, PMH Dementia, presented to the [facility] ED following a fall at home. Patient was moving around the recycling, when he tripped and fell to the ground. His son found him on the ground and called EMS. None

## 2024-06-01 NOTE — PROGRESS NOTE ADULT - PROVIDER SPECIALTY LIST ADULT
Endocrinology
Internal Medicine
Orthopedics
Internal Medicine
Internal Medicine
no